# Patient Record
Sex: FEMALE | Race: WHITE | Employment: UNEMPLOYED | URBAN - METROPOLITAN AREA
[De-identification: names, ages, dates, MRNs, and addresses within clinical notes are randomized per-mention and may not be internally consistent; named-entity substitution may affect disease eponyms.]

---

## 2017-08-28 ENCOUNTER — TELEPHONE (OUTPATIENT)
Dept: INTERNAL MEDICINE CLINIC | Facility: CLINIC | Age: 37
End: 2017-08-28

## 2018-11-26 ENCOUNTER — OFFICE VISIT (OUTPATIENT)
Dept: INTERNAL MEDICINE CLINIC | Facility: CLINIC | Age: 38
End: 2018-11-26
Payer: COMMERCIAL

## 2018-11-26 VITALS
HEART RATE: 61 BPM | WEIGHT: 127 LBS | HEIGHT: 65 IN | BODY MASS INDEX: 21.16 KG/M2 | RESPIRATION RATE: 17 BRPM | DIASTOLIC BLOOD PRESSURE: 52 MMHG | SYSTOLIC BLOOD PRESSURE: 108 MMHG | OXYGEN SATURATION: 98 %

## 2018-11-26 DIAGNOSIS — R23.2 HOT FLASHES: ICD-10-CM

## 2018-11-26 DIAGNOSIS — Z23 NEED FOR INFLUENZA VACCINATION: ICD-10-CM

## 2018-11-26 DIAGNOSIS — F41.0 ANXIETY ATTACK: ICD-10-CM

## 2018-11-26 DIAGNOSIS — N92.0 MENORRHAGIA WITH REGULAR CYCLE: ICD-10-CM

## 2018-11-26 DIAGNOSIS — Z00.00 PHYSICAL EXAM: Primary | ICD-10-CM

## 2018-11-26 PROCEDURE — 99396 PREV VISIT EST AGE 40-64: CPT | Performed by: FAMILY MEDICINE

## 2018-11-26 PROCEDURE — 90686 IIV4 VACC NO PRSV 0.5 ML IM: CPT | Performed by: FAMILY MEDICINE

## 2018-11-26 PROCEDURE — 90471 IMMUNIZATION ADMIN: CPT | Performed by: FAMILY MEDICINE

## 2018-11-26 NOTE — PROGRESS NOTES
HPI:   Oriana Spring is a 34year old female who presents for a complete physical exam. Symptoms: denies discharge, itching, burning or dysuria.    Last pap:  9/2015 normal  And HPV negative, on period today  Menses:   Normal , heavier  Worried hormones o Grandfather         bone, bladder and prostate cancer   • Melanoma Maternal Grandfather    • Ovarian Cancer Maternal Aunt    • Hypertension Paternal Grandfather    • Diabetes Paternal Grandfather    • Heart Disorder Paternal Grandfather    • Non Melanoma M and neg HPV 2015. Normal before that. Can wait 1-2 more years per guidelines. Will do at next physical if does not get done by gyn  · Mammogram:  needs. Self breast exam explained and encouraged to perform monthly.    · Health maintenance, will check fasti

## 2018-12-05 ENCOUNTER — NURSE ONLY (OUTPATIENT)
Dept: INTERNAL MEDICINE CLINIC | Facility: CLINIC | Age: 38
End: 2018-12-05
Payer: COMMERCIAL

## 2018-12-05 DIAGNOSIS — L21.9 SEBORRHEIC DERMATITIS, UNSPECIFIED: ICD-10-CM

## 2018-12-05 DIAGNOSIS — Z00.00 PHYSICAL EXAM: ICD-10-CM

## 2018-12-05 PROCEDURE — 80050 GENERAL HEALTH PANEL: CPT | Performed by: FAMILY MEDICINE

## 2018-12-05 PROCEDURE — 80061 LIPID PANEL: CPT | Performed by: FAMILY MEDICINE

## 2018-12-05 PROCEDURE — 36415 COLL VENOUS BLD VENIPUNCTURE: CPT | Performed by: FAMILY MEDICINE

## 2018-12-05 NOTE — PROGRESS NOTES
Pt presented to clinic today for blood draw. Per physician able to draw orders. Orders  documented within chart. Pt tolerated lab draw well.  verified.   Orders drawn include: tsh, cmp, lipid, cbc   Site of draw: rt moises Rasmussen, MARILEE

## 2018-12-06 ENCOUNTER — TELEPHONE (OUTPATIENT)
Dept: INTERNAL MEDICINE CLINIC | Facility: CLINIC | Age: 38
End: 2018-12-06

## 2019-01-10 ENCOUNTER — TELEPHONE (OUTPATIENT)
Dept: INTERNAL MEDICINE CLINIC | Facility: CLINIC | Age: 39
End: 2019-01-10

## 2019-01-10 NOTE — TELEPHONE ENCOUNTER
Ready to try antidepressant-antianxiety. Please call. Can she have a script first, then follow-up? [facility] on [address].

## 2019-01-10 NOTE — TELEPHONE ENCOUNTER
Informed patient that lexapro was ordered. Discussed potential side effects, when to stop and call MD, and it may take several weeks before she feels positive effects. Patient to make appointment to see PCP in 4 weeks after starting medication.

## 2019-02-07 RX ORDER — ESCITALOPRAM OXALATE 10 MG/1
TABLET ORAL
Qty: 30 TABLET | Refills: 0 | Status: SHIPPED | OUTPATIENT
Start: 2019-02-07 | End: 2019-04-29

## 2019-02-08 ENCOUNTER — TELEPHONE (OUTPATIENT)
Dept: INTERNAL MEDICINE CLINIC | Facility: CLINIC | Age: 39
End: 2019-02-08

## 2019-02-08 NOTE — TELEPHONE ENCOUNTER
Per Dr. Pauleen Lundborg, called patient, no answer, left vm informing that she will need to make  follow up appt before medication can be approved again.

## 2019-02-20 PROBLEM — F41.9 ANXIETY: Status: ACTIVE | Noted: 2019-02-20

## 2019-02-20 NOTE — PROGRESS NOTES
CC:  Medication Follow-Up (Patient is here for follow up on Lexapro)      Hx of CC:    F/u lexapro for anxiety  Not much difference at all, maybe the slightest bit less anxious  Feels shut down still and easily irritated  Anxiety not much better  Dark ment chest pain   Pulmonary:  No cough, no SOB  GI:  No N/V/D  Dermatologic:  No rashes    Physical:    Ht 65\"   Wt 121 lb 3.2 oz   BMI 20.17 kg/m²     General:  Alert, appropriate, no acute distress  HEENT:  Normocephalic, supple. Moist mucus membranes.    Hitesh

## 2019-02-26 ENCOUNTER — TELEPHONE (OUTPATIENT)
Dept: INTERNAL MEDICINE CLINIC | Facility: CLINIC | Age: 39
End: 2019-02-26

## 2019-02-26 DIAGNOSIS — N76.0 ACUTE VAGINITIS: Primary | ICD-10-CM

## 2019-02-26 RX ORDER — FLUCONAZOLE 150 MG/1
150 TABLET ORAL ONCE
Qty: 1 TABLET | Refills: 0 | Status: SHIPPED | OUTPATIENT
Start: 2019-02-26 | End: 2019-02-26

## 2019-02-26 NOTE — TELEPHONE ENCOUNTER
Patient called stating she has recurring yeast infections and would like to know if she can get a prescription for Diflucan.  She has used this in the past.

## 2019-04-28 DIAGNOSIS — F41.9 ANXIETY: ICD-10-CM

## 2019-04-29 RX ORDER — ESCITALOPRAM OXALATE 20 MG/1
TABLET ORAL
Qty: 30 TABLET | Refills: 1 | Status: SHIPPED | OUTPATIENT
Start: 2019-04-29 | End: 2019-07-06

## 2019-07-06 DIAGNOSIS — F41.9 ANXIETY: ICD-10-CM

## 2019-07-08 RX ORDER — ESCITALOPRAM OXALATE 20 MG/1
TABLET ORAL
Qty: 30 TABLET | Refills: 1 | Status: SHIPPED | OUTPATIENT
Start: 2019-07-08 | End: 2019-07-31

## 2019-07-31 ENCOUNTER — OFFICE VISIT (OUTPATIENT)
Dept: INTERNAL MEDICINE CLINIC | Facility: CLINIC | Age: 39
End: 2019-07-31
Payer: COMMERCIAL

## 2019-07-31 VITALS
WEIGHT: 120.81 LBS | BODY MASS INDEX: 20.13 KG/M2 | TEMPERATURE: 99 F | HEART RATE: 62 BPM | DIASTOLIC BLOOD PRESSURE: 64 MMHG | HEIGHT: 65 IN | OXYGEN SATURATION: 100 % | RESPIRATION RATE: 18 BRPM | SYSTOLIC BLOOD PRESSURE: 102 MMHG

## 2019-07-31 DIAGNOSIS — F41.9 ANXIETY: ICD-10-CM

## 2019-07-31 PROCEDURE — 99213 OFFICE O/P EST LOW 20 MIN: CPT | Performed by: FAMILY MEDICINE

## 2019-07-31 RX ORDER — ESCITALOPRAM OXALATE 20 MG/1
20 TABLET ORAL
Qty: 90 TABLET | Refills: 3 | Status: SHIPPED | OUTPATIENT
Start: 2019-07-31 | End: 2020-03-08

## 2019-07-31 NOTE — PROGRESS NOTES
CC:  Follow - Up (Medication follow up )      Hx of CC:    F/u lexapro 20   Doing great  Mood stable  No SE  Sleeping fine  Feels improved energy, motivation and less irritable   Less anxious and not worrying as much  Does exercise often      Allergies:  N see above     Physical:    /64 (BP Location: Right arm, Patient Position: Sitting, Cuff Size: adult)   Pulse 62   Temp 98.7 °F (37.1 °C) (Oral)   Resp 18   Ht 65\"   Wt 120 lb 12.8 oz   LMP 07/30/2019   SpO2 100%   BMI 20.10 kg/m²     General:  Alert

## 2020-01-21 ENCOUNTER — OFFICE VISIT (OUTPATIENT)
Dept: OBGYN CLINIC | Facility: CLINIC | Age: 40
End: 2020-01-21
Payer: COMMERCIAL

## 2020-01-21 VITALS
HEART RATE: 51 BPM | BODY MASS INDEX: 20.66 KG/M2 | WEIGHT: 124 LBS | DIASTOLIC BLOOD PRESSURE: 68 MMHG | SYSTOLIC BLOOD PRESSURE: 114 MMHG | HEIGHT: 65 IN

## 2020-01-21 DIAGNOSIS — N92.3 SPOTTING BETWEEN MENSES: ICD-10-CM

## 2020-01-21 DIAGNOSIS — Z12.31 VISIT FOR SCREENING MAMMOGRAM: ICD-10-CM

## 2020-01-21 DIAGNOSIS — Z12.4 SCREENING FOR MALIGNANT NEOPLASM OF CERVIX: ICD-10-CM

## 2020-01-21 DIAGNOSIS — N89.8 VAGINAL ODOR: ICD-10-CM

## 2020-01-21 DIAGNOSIS — Z01.419 ENCOUNTER FOR GYNECOLOGICAL EXAMINATION WITHOUT ABNORMAL FINDING: Primary | ICD-10-CM

## 2020-01-21 PROCEDURE — 99386 PREV VISIT NEW AGE 40-64: CPT | Performed by: OBSTETRICS & GYNECOLOGY

## 2020-01-21 PROCEDURE — 99213 OFFICE O/P EST LOW 20 MIN: CPT | Performed by: OBSTETRICS & GYNECOLOGY

## 2020-01-21 NOTE — PROGRESS NOTES
Uri Moore is a 37year old female  Patient's last menstrual period was 2020. Patient presents with:  Gyn Exam: annual  Pt c/o sweating more at night right before her menses- never hot flashes during the day.   She informed her pcp of thi Comment: 3 times a week      Drug use: Not on file      Sexual activity: Not on file    Lifestyle      Physical activity:        Days per week: Not on file        Minutes per session: Not on file      Stress: Not on file    Relationships      Social conne mouth once daily. , Disp: 90 tablet, Rfl: 3    ALLERGIES:  No Known Allergies    Blood pressure 114/68, pulse 51, height 5' 5\" (1.651 m), weight 124 lb (56.2 kg), last menstrual period 01/13/2020.     Review of Systems:  Constitutional:  Denies fatigue, nig tenderness on motion,+contact bleeding with pap  Uterus: normal in size, contour, position, mobility, without tenderness  Adnexa: normal without masses or tenderness  Perineum: normal  Anus: no hemorroids     Assessment & Plan:    Encounter for gynecologic

## 2020-01-22 ENCOUNTER — TELEPHONE (OUTPATIENT)
Dept: OBGYN CLINIC | Facility: CLINIC | Age: 40
End: 2020-01-22

## 2020-01-22 LAB
GENITAL VAGINOSIS SCREEN: POSITIVE
HPV I/H RISK 1 DNA SPEC QL NAA+PROBE: POSITIVE
TRICHOMONAS SCREEN: NEGATIVE

## 2020-01-22 NOTE — TELEPHONE ENCOUNTER
----- Message from Evonne Denton MD sent at 1/22/2020 12:39 PM CST -----  Please inform pt that vag culture results were positive for BV which is an overgrowth of normal vaginal bacteria.   Please send erx for metrogel vag 1 applicator intravag qhs x 5 co

## 2020-01-23 RX ORDER — METRONIDAZOLE 7.5 MG/G
GEL VAGINAL
Qty: 1 TUBE | Refills: 0 | Status: SHIPPED | OUTPATIENT
Start: 2020-01-23 | End: 2021-04-07 | Stop reason: ALTCHOICE

## 2020-02-14 ENCOUNTER — HOSPITAL ENCOUNTER (OUTPATIENT)
Dept: MAMMOGRAPHY | Facility: HOSPITAL | Age: 40
Discharge: HOME | End: 2020-02-14
Attending: OBSTETRICS & GYNECOLOGY
Payer: COMMERCIAL

## 2020-02-14 DIAGNOSIS — Z12.31 VISIT FOR SCREENING MAMMOGRAM: ICD-10-CM

## 2020-02-14 PROCEDURE — 77067 SCR MAMMO BI INCL CAD: CPT | Performed by: OBSTETRICS & GYNECOLOGY

## 2020-02-14 PROCEDURE — 77063 BREAST TOMOSYNTHESIS BI: CPT | Performed by: OBSTETRICS & GYNECOLOGY

## 2020-02-26 ENCOUNTER — HOSPITAL ENCOUNTER (OUTPATIENT)
Dept: ULTRASOUND IMAGING | Facility: HOSPITAL | Age: 40
Discharge: HOME | End: 2020-02-26
Attending: OBSTETRICS & GYNECOLOGY
Payer: COMMERCIAL

## 2020-02-26 ENCOUNTER — TELEPHONE (OUTPATIENT)
Dept: OBGYN CLINIC | Facility: CLINIC | Age: 40
End: 2020-02-26

## 2020-02-26 ENCOUNTER — APPOINTMENT (OUTPATIENT)
Dept: LAB | Facility: HOSPITAL | Age: 40
End: 2020-02-26
Attending: OBSTETRICS & GYNECOLOGY
Payer: COMMERCIAL

## 2020-02-26 DIAGNOSIS — Z32.00 PREGNANCY EXAMINATION OR TEST, PREGNANCY UNCONFIRMED: Primary | ICD-10-CM

## 2020-02-26 DIAGNOSIS — N92.3 SPOTTING BETWEEN MENSES: ICD-10-CM

## 2020-02-26 LAB — HCG SERPL QL: NEGATIVE

## 2020-02-26 PROCEDURE — 84703 CHORIONIC GONADOTROPIN ASSAY: CPT

## 2020-02-26 PROCEDURE — 36415 COLL VENOUS BLD VENIPUNCTURE: CPT

## 2020-02-26 PROCEDURE — 76830 TRANSVAGINAL US NON-OB: CPT | Performed by: OBSTETRICS & GYNECOLOGY

## 2020-02-26 PROCEDURE — 76856 US EXAM PELVIC COMPLETE: CPT | Performed by: OBSTETRICS & GYNECOLOGY

## 2020-02-26 NOTE — TELEPHONE ENCOUNTER
PT IS ALREADY SCHEDULED FOR EMBX. ASSURED PT CAP'S NOTES INDICATE IT DOESN'T MATTER WHICH TEST IS DONE FIRST SO ASSISTED IN SCHEDULING COLPO. PT CHECKED HER FEDER[place] AND IS FAIRLY CERTAIN SHE WILL NOT BLEEDING FIRST WEEK OF April SO BOOKED COLPO FOR 4-3-20.

## 2020-02-27 ENCOUNTER — OFFICE VISIT (OUTPATIENT)
Dept: OBGYN CLINIC | Facility: CLINIC | Age: 40
End: 2020-02-27
Payer: COMMERCIAL

## 2020-02-27 VITALS
BODY MASS INDEX: 21 KG/M2 | SYSTOLIC BLOOD PRESSURE: 124 MMHG | DIASTOLIC BLOOD PRESSURE: 74 MMHG | WEIGHT: 125 LBS | HEART RATE: 60 BPM

## 2020-02-27 DIAGNOSIS — N84.0 ENDOMETRIAL POLYP: ICD-10-CM

## 2020-02-27 DIAGNOSIS — N92.3 SPOTTING BETWEEN MENSES: Primary | ICD-10-CM

## 2020-02-27 PROBLEM — N93.9 ABNORMAL UTERINE BLEEDING DUE TO ENDOMETRIAL POLYP: Status: ACTIVE | Noted: 2020-02-27

## 2020-02-27 PROCEDURE — 58100 BIOPSY OF UTERUS LINING: CPT | Performed by: OBSTETRICS & GYNECOLOGY

## 2020-02-27 PROCEDURE — 99212 OFFICE O/P EST SF 10 MIN: CPT | Performed by: OBSTETRICS & GYNECOLOGY

## 2020-02-27 NOTE — PROCEDURES
Endometrial Biopsy    Pre-Procedure Care:   Consent was obtained. Procedure/risks were explained. Questions were answered. Correct patient was identified. Correct side and site were confirmed.     Pregnancy Results: negative from blood test   Birth cont

## 2020-02-27 NOTE — PROGRESS NOTES
Teo Ardon    11/3/1980       Patient presents with:  Procedure: ENDOMETRIAL BX  pt had pelvic US done 2/26/20 and there appears to be a 0.8cm polyp- I explained to pt that this the most likely cause for her metrorrhagia.   We discussed manage

## 2020-03-08 DIAGNOSIS — F41.9 ANXIETY: ICD-10-CM

## 2020-03-09 RX ORDER — ESCITALOPRAM OXALATE 20 MG/1
20 TABLET ORAL
Qty: 90 TABLET | Refills: 3 | Status: SHIPPED | OUTPATIENT
Start: 2020-03-09 | End: 2020-11-05

## 2020-04-02 ENCOUNTER — PATIENT MESSAGE (OUTPATIENT)
Dept: OBGYN CLINIC | Facility: CLINIC | Age: 40
End: 2020-04-02

## 2020-04-02 NOTE — TELEPHONE ENCOUNTER
Message to [address] (home MD).     HERB winn to r/s colpo, pt pap was ASCUS +HPV

## 2020-04-02 NOTE — TELEPHONE ENCOUNTER
From: Sophia Ardon  To: Adrienne Jacinto MD  Sent: 4/2/2020 12:22 AM CDT  Subject: Non-Urgent Medical Question    Good evening Dr. Robby Santana,    I hope this finds you doing well and staying healthy.      I wanted to get your opinion before I started ca

## 2020-10-02 ENCOUNTER — APPOINTMENT (OUTPATIENT)
Dept: LAB | Age: 40
End: 2020-10-02
Attending: FAMILY MEDICINE
Payer: COMMERCIAL

## 2020-10-02 ENCOUNTER — TELEPHONE (OUTPATIENT)
Dept: INTERNAL MEDICINE CLINIC | Facility: CLINIC | Age: 40
End: 2020-10-02

## 2020-10-02 DIAGNOSIS — Z20.822 SUSPECTED COVID-19 VIRUS INFECTION: ICD-10-CM

## 2020-10-02 DIAGNOSIS — Z20.822 SUSPECTED COVID-19 VIRUS INFECTION: Primary | ICD-10-CM

## 2020-10-02 NOTE — TELEPHONE ENCOUNTER
Patient has allergies with sneezing and itching eyes. Not sure if these symptoms are strictly allergy this time. ALSO has a dry cough along with achiness. The latter symptoms started yesterday.     Within her group of friends, numerous people have tested p

## 2020-10-02 NOTE — TELEPHONE ENCOUNTER
Patient would like a call back from the Triage Nurse as she would like to discuss Covid symptoms and testing. She has allergies, so she wants to make sure it truly is allergies and not Covid. She has itchy eyes, and sneezing.   Cough in the mornings but

## 2020-10-24 ENCOUNTER — IMMUNIZATION (OUTPATIENT)
Dept: FAMILY MEDICINE CLINIC | Facility: CLINIC | Age: 40
End: 2020-10-24
Payer: COMMERCIAL

## 2020-10-24 PROCEDURE — 90471 IMMUNIZATION ADMIN: CPT | Performed by: PHYSICIAN ASSISTANT

## 2020-10-24 PROCEDURE — 90686 IIV4 VACC NO PRSV 0.5 ML IM: CPT | Performed by: PHYSICIAN ASSISTANT

## 2020-11-05 DIAGNOSIS — F41.9 ANXIETY: ICD-10-CM

## 2020-11-05 RX ORDER — ESCITALOPRAM OXALATE 20 MG/1
20 TABLET ORAL
Qty: 90 TABLET | Refills: 3 | Status: SHIPPED | OUTPATIENT
Start: 2020-11-05 | End: 2021-04-07

## 2021-01-29 ENCOUNTER — TELEPHONE (OUTPATIENT)
Dept: INTERNAL MEDICINE CLINIC | Facility: CLINIC | Age: 41
End: 2021-01-29

## 2021-01-29 DIAGNOSIS — Z00.00 PHYSICAL EXAM: Primary | ICD-10-CM

## 2021-01-29 NOTE — TELEPHONE ENCOUNTER
Patient scheduled her annual physical for 4/7. She's asking if an order can please be put in, so she can set-up a nurse appointment to have this done before her appointment.

## 2021-04-06 ENCOUNTER — NURSE ONLY (OUTPATIENT)
Dept: INTERNAL MEDICINE CLINIC | Facility: CLINIC | Age: 41
End: 2021-04-06
Payer: COMMERCIAL

## 2021-04-06 DIAGNOSIS — D64.9 ANEMIA, UNSPECIFIED TYPE: ICD-10-CM

## 2021-04-06 DIAGNOSIS — Z00.00 PHYSICAL EXAM: ICD-10-CM

## 2021-04-06 PROCEDURE — 82728 ASSAY OF FERRITIN: CPT | Performed by: FAMILY MEDICINE

## 2021-04-06 PROCEDURE — 80061 LIPID PANEL: CPT | Performed by: FAMILY MEDICINE

## 2021-04-06 PROCEDURE — 36415 COLL VENOUS BLD VENIPUNCTURE: CPT | Performed by: FAMILY MEDICINE

## 2021-04-06 PROCEDURE — 84466 ASSAY OF TRANSFERRIN: CPT | Performed by: FAMILY MEDICINE

## 2021-04-06 PROCEDURE — 80050 GENERAL HEALTH PANEL: CPT | Performed by: FAMILY MEDICINE

## 2021-04-06 PROCEDURE — 82306 VITAMIN D 25 HYDROXY: CPT | Performed by: FAMILY MEDICINE

## 2021-04-06 PROCEDURE — 83540 ASSAY OF IRON: CPT | Performed by: FAMILY MEDICINE

## 2021-04-07 ENCOUNTER — OFFICE VISIT (OUTPATIENT)
Dept: INTERNAL MEDICINE CLINIC | Facility: CLINIC | Age: 41
End: 2021-04-07
Payer: COMMERCIAL

## 2021-04-07 VITALS
DIASTOLIC BLOOD PRESSURE: 62 MMHG | BODY MASS INDEX: 20.66 KG/M2 | HEIGHT: 65 IN | OXYGEN SATURATION: 100 % | SYSTOLIC BLOOD PRESSURE: 116 MMHG | WEIGHT: 124 LBS | HEART RATE: 60 BPM | RESPIRATION RATE: 14 BRPM

## 2021-04-07 DIAGNOSIS — F41.9 ANXIETY: ICD-10-CM

## 2021-04-07 DIAGNOSIS — H61.21 CERUMEN DEBRIS ON TYMPANIC MEMBRANE OF RIGHT EAR: ICD-10-CM

## 2021-04-07 DIAGNOSIS — Z12.31 ENCOUNTER FOR SCREENING MAMMOGRAM FOR MALIGNANT NEOPLASM OF BREAST: ICD-10-CM

## 2021-04-07 DIAGNOSIS — D50.9 IRON DEFICIENCY ANEMIA, UNSPECIFIED IRON DEFICIENCY ANEMIA TYPE: ICD-10-CM

## 2021-04-07 DIAGNOSIS — Z00.00 PHYSICAL EXAM: Primary | ICD-10-CM

## 2021-04-07 PROCEDURE — 3074F SYST BP LT 130 MM HG: CPT | Performed by: FAMILY MEDICINE

## 2021-04-07 PROCEDURE — 3078F DIAST BP <80 MM HG: CPT | Performed by: FAMILY MEDICINE

## 2021-04-07 PROCEDURE — 3008F BODY MASS INDEX DOCD: CPT | Performed by: FAMILY MEDICINE

## 2021-04-07 PROCEDURE — 99396 PREV VISIT EST AGE 40-64: CPT | Performed by: FAMILY MEDICINE

## 2021-04-07 RX ORDER — MELATONIN
325
Qty: 90 TABLET | Refills: 2 | Status: SHIPPED | OUTPATIENT
Start: 2021-04-07

## 2021-04-07 RX ORDER — ESCITALOPRAM OXALATE 20 MG/1
20 TABLET ORAL
Qty: 90 TABLET | Refills: 3 | Status: SHIPPED | OUTPATIENT
Start: 2021-04-07 | End: 2021-12-10

## 2021-04-07 RX ORDER — MULTIVIT-MIN/IRON FUM/FOLIC AC 7.5 MG-4
1 TABLET ORAL DAILY
COMMUNITY

## 2021-04-07 RX ORDER — DOCUSATE SODIUM 100 MG/1
100 CAPSULE, LIQUID FILLED ORAL 2 TIMES DAILY PRN
Qty: 180 CAPSULE | Refills: 0 | Status: SHIPPED | OUTPATIENT
Start: 2021-04-07

## 2021-04-07 NOTE — PROGRESS NOTES
HPI:   Inga Vlades is a 41year old female who presents for a complete physical exam.    Last pap:  Needs to sched colpo with gyn for ascus  Has had some gyn issues- needs to f/u with gyn- overdue  Went there for spotting in between periods.  Dx 1,000 Units by mouth daily. • escitalopram 20 MG Oral Tab Take 1 tablet (20 mg total) by mouth once daily.  90 tablet 3      Past Medical History:   Diagnosis Date   • Depression with anxiety     on lexapro      Past Surgical History:   Procedure Brad Lewis EYES:PERRLA, EOMI,conjunctiva are clear  NECK: supple,no adenopathy  BREAST: no dominant or suspicious mass, no nipple discharge  LUNGS: clear to auscultation  CARDIO: RRR without murmur  GI: good BS's,no masses, HSM or tenderness  EXTREMITIES: no edema she should decrease to twice a day and contact me. She has moderately decreased WBC of 3.6 on her CBC. If this does not improve as the hemoglobin improves well discuss  referral to hematology for further eval.  Recheck blood work in 1 month.     - keegan

## 2021-04-12 ENCOUNTER — TELEPHONE (OUTPATIENT)
Dept: OBGYN CLINIC | Facility: CLINIC | Age: 41
End: 2021-04-12

## 2021-04-12 DIAGNOSIS — Z32.00 PREGNANCY EXAMINATION OR TEST, PREGNANCY UNCONFIRMED: Primary | ICD-10-CM

## 2021-04-13 ENCOUNTER — HOSPITAL ENCOUNTER (OUTPATIENT)
Dept: MAMMOGRAPHY | Age: 41
Discharge: HOME | End: 2021-04-13
Attending: FAMILY MEDICINE
Payer: COMMERCIAL

## 2021-04-13 DIAGNOSIS — Z12.31 ENCOUNTER FOR SCREENING MAMMOGRAM FOR MALIGNANT NEOPLASM OF BREAST: ICD-10-CM

## 2021-04-13 PROCEDURE — 77063 BREAST TOMOSYNTHESIS BI: CPT | Performed by: FAMILY MEDICINE

## 2021-04-13 PROCEDURE — 77067 SCR MAMMO BI INCL CAD: CPT | Performed by: FAMILY MEDICINE

## 2021-05-19 ENCOUNTER — LAB ENCOUNTER (OUTPATIENT)
Dept: LAB | Age: 41
End: 2021-05-19
Attending: OBSTETRICS & GYNECOLOGY
Payer: COMMERCIAL

## 2021-05-19 DIAGNOSIS — Z32.00 PREGNANCY EXAMINATION OR TEST, PREGNANCY UNCONFIRMED: ICD-10-CM

## 2021-05-19 DIAGNOSIS — D50.9 IRON DEFICIENCY ANEMIA, UNSPECIFIED IRON DEFICIENCY ANEMIA TYPE: ICD-10-CM

## 2021-05-19 PROCEDURE — 85025 COMPLETE CBC W/AUTO DIFF WBC: CPT

## 2021-05-19 PROCEDURE — 83540 ASSAY OF IRON: CPT

## 2021-05-19 PROCEDURE — 84703 CHORIONIC GONADOTROPIN ASSAY: CPT

## 2021-05-19 PROCEDURE — 36415 COLL VENOUS BLD VENIPUNCTURE: CPT

## 2021-05-19 PROCEDURE — 84466 ASSAY OF TRANSFERRIN: CPT

## 2021-05-20 ENCOUNTER — TELEPHONE (OUTPATIENT)
Dept: OBGYN CLINIC | Facility: CLINIC | Age: 41
End: 2021-05-20

## 2021-05-20 ENCOUNTER — OFFICE VISIT (OUTPATIENT)
Dept: OBGYN CLINIC | Facility: CLINIC | Age: 41
End: 2021-05-20
Payer: COMMERCIAL

## 2021-05-20 ENCOUNTER — TELEPHONE (OUTPATIENT)
Dept: INTERNAL MEDICINE CLINIC | Facility: CLINIC | Age: 41
End: 2021-05-20

## 2021-05-20 VITALS
SYSTOLIC BLOOD PRESSURE: 122 MMHG | HEART RATE: 57 BPM | DIASTOLIC BLOOD PRESSURE: 73 MMHG | BODY MASS INDEX: 20 KG/M2 | WEIGHT: 122.81 LBS

## 2021-05-20 DIAGNOSIS — D50.9 IRON DEFICIENCY ANEMIA, UNSPECIFIED IRON DEFICIENCY ANEMIA TYPE: Primary | ICD-10-CM

## 2021-05-20 DIAGNOSIS — R87.810 ASCUS WITH POSITIVE HIGH RISK HPV CERVICAL: Primary | ICD-10-CM

## 2021-05-20 DIAGNOSIS — R87.610 ASCUS WITH POSITIVE HIGH RISK HPV CERVICAL: Primary | ICD-10-CM

## 2021-05-20 PROCEDURE — 57454 BX/CURETT OF CERVIX W/SCOPE: CPT | Performed by: OBSTETRICS & GYNECOLOGY

## 2021-05-20 PROCEDURE — 3078F DIAST BP <80 MM HG: CPT | Performed by: OBSTETRICS & GYNECOLOGY

## 2021-05-20 PROCEDURE — 3074F SYST BP LT 130 MM HG: CPT | Performed by: OBSTETRICS & GYNECOLOGY

## 2021-05-20 NOTE — TELEPHONE ENCOUNTER
Per our discussion at her office visit today I looked up what procedure she was supposed to have prior to covid.   She is still having intermenstrual bleeding and on her US last year she had an 8mm endometrial polyp- we discussed endosee vs hy/D&C at the ti

## 2021-05-20 NOTE — TELEPHONE ENCOUNTER
Schedule endosee for this patient:  **Do referral if needed. **    Timing: D#7-10, with D#1 being flow    Diagnosis: intermenstrual bleeding, endometrial polyp    Blood tests: n/a    Medication prep: No    Pain med prep: motrin 600 mg one hour before    Procedure room: needed    Rep Needed: No    Additional equipment: no

## 2021-05-20 NOTE — TELEPHONE ENCOUNTER
Pt. Given recs per CAP and instructed to call on day #1 of her period ( day 1 being a full flow). Pt. Verbalized understanding.

## 2021-05-20 NOTE — TELEPHONE ENCOUNTER
Called patient to follow-up on blood test.  Her iron and hemoglobin are now normal.  She has been seeing Gyn for work-up of heavy periods and also spotting in between her periods.   She has a possible polyp and is can get a procedure to see if this needs to

## 2021-05-20 NOTE — PROCEDURES
Colpo w/Cx Biopsy and ECC    Pregnancy Results: n/a  Birth control method(s) used:   condoms      Consent signed. Procedure discussed with patient in detail including indication, risk, benefits, alternatives and complications.     Pre-Procedure:  Pre-Meds:

## 2021-05-26 NOTE — PROGRESS NOTES
Pt advised of results and recs and verbalized understanding. Pt also reports her period started today and she needs to schedule an endosee but pt is leaving town for 2 weeks. Advised pt to call on day 1 of next period.

## 2021-08-16 ENCOUNTER — TELEPHONE (OUTPATIENT)
Dept: OBGYN CLINIC | Facility: CLINIC | Age: 41
End: 2021-08-16

## 2021-08-16 NOTE — TELEPHONE ENCOUNTER
Pt calling to report she started her period yesterday and needs to schedule endosee. Pt informed endosee is scheduled between days 7-10 of her cycle, and based on day 1 of her cycle we would only be able to schedule her in this time frame on either 8/23 or 8/24. Pt informed that CAP is out of the office on 8/24. Message to CAP to please advise if pt can be added for endosee on 8/23?

## 2021-08-18 NOTE — TELEPHONE ENCOUNTER
Pt offered appt. Pt stated that she cannot come in until early in the morning or anytime after 4pm. Pt stated she started a new job and needs to be there for training right now. Asked pt if she would possibly be able to do an 8am with CAP on 8/24 if this were even an option (pt informed that CAP is not in the office this day and would be on call). Pt stated she will just wait until her next cycle to call to schedule endosee. Message to CAP as FYDANIE.

## 2021-08-18 NOTE — TELEPHONE ENCOUNTER
Endosee can be scheduled at 11:30 am on 8/23/2021. Schedule endosee for this patient:  **Do referral if needed. **    Timing: D#7-10, with D#1 being flow    Diagnosis: history of endometrial polyp    Blood tests: UPT in office    Medication prep: 400 mcg cytotec night prior    Pain med prep: motrin 600 mg one hour before    Procedure room: Yes    Rep Needed: No    Additional equipment: endoscopic grasper Reviewed results - ASCUS Pap, positive HPV, atypical glandular cells present..   Discussed FYWB Colposcopy and endometrial biopsy with her including:  Colposcopy is a procedure to closely examine your cervix, vagina and vulva for signs of disease. During colposcopy, your doctor uses a special instrument called a colposcope. If your doctor finds an unusual area of cells during colposcopy, a sample of tissue can be collected for laboratory testing (biopsy).    Colposcopy is usually done in the office, and the procedure typically takes 10 to 20 minutes. You'll lie on your back on a table with your feet in supports, just as during a pelvic exam or Pap test.  The doctor places a metal speculum in your vagina. Your doctor positions the special magnifying instrument, called a colposcope, a few inches away from your vulva. A bright light is shown into your vagina, and your doctor looks through the lens. Your cervix and vagina are swabbed with cotton to clear away any mucus. Your doctor may apply a solution of vinegar, this may cause a burning or tingling sensation. The solution helps highlight any areas of suspicious cells. If your doctor finds a suspicious area, a small sample of tissue may be collected for laboratory testing. To collect the tissue, your doctor uses a sharp biopsy instrument to remove a small piece of tissue.  To prepare for your colposcopy, your doctor may recommend that you:  Avoid scheduling your colposcopy during your period  Don't have vaginal intercourse the day or two before your colposcopy  Don't use tampons the day or two before your colposcopy  Don’t use vaginal medications for the two days before your colposcopy  Take an over-the-counter pain reliever, such as ibuprofen (Advil, Motrin IB, others) or acetaminophen (Tylenol, others), before going to your colposcopy appointment  Patient states understanding and has no questions.   Transferred to schedule appt

## 2021-12-09 DIAGNOSIS — F41.9 ANXIETY: ICD-10-CM

## 2021-12-10 RX ORDER — ESCITALOPRAM OXALATE 20 MG/1
TABLET ORAL
Qty: 90 TABLET | Refills: 3 | Status: SHIPPED | OUTPATIENT
Start: 2021-12-10

## 2022-01-06 LAB — AMB EXT COVID-19 RESULT: DETECTED

## 2022-01-14 ENCOUNTER — TELEPHONE (OUTPATIENT)
Dept: INTERNAL MEDICINE CLINIC | Facility: CLINIC | Age: 42
End: 2022-01-14

## 2022-01-14 NOTE — TELEPHONE ENCOUNTER
1/06 was positive. In the beginning had HA, aches, cough. Today feeling better except she has now developed a very deep cough and is fatigued-sleeping a lot. Afebrile. Cough not productive but feels something is \"moving\".   Cough occurs maybe twice da

## 2022-03-16 RX ORDER — ESCITALOPRAM OXALATE 20 MG/1
20 TABLET ORAL DAILY
Qty: 90 TABLET | Refills: 0 | Status: SHIPPED | OUTPATIENT
Start: 2022-03-16

## 2022-03-22 ENCOUNTER — TELEPHONE (OUTPATIENT)
Dept: INTERNAL MEDICINE CLINIC | Facility: HOSPITAL | Age: 42
End: 2022-03-22

## 2022-03-22 NOTE — TELEPHONE ENCOUNTER
[] [address] [facility]  []NICOLE   [x] [facility]  Manager :  Beto Jarrell    HAVE YOU RECEIVED THE COVID-19 Vaccine? Yes [x]    No []          If yes, date(s) received: 1/21/21; 2/11/21/ 10/30/21           Which vaccine:  Pfizer [x]     Ayah Soto []    J&J []      SYMPTOMS (reported via dashboard):  [] asymptomatic  [x] symptomatic - fever, cough, congestion, fatigue, body aches, chills, headache  [] GI symptoms only    Symptom onset date: 3/21/22  Any fever, vomiting or diarrhea?:Yes [x]     No []    If yes describe: fever    Employee has positive COVID Exposure? 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   []  May return to work if employee views negative result in [address] and remains fever, vomiting, and diarrhea free  []  May continue to work if remains asymptomatic and views negative result in [address]  [x]  Follow up for condition update when resulting  []  If symptoms develop, stay home and call hotline for rapid test order  []  If COVID positive results, off work minimum of 5 days from positive test or onset of symptoms (day 0)    []      On day 5, if asymptomatic or mildly symptomatic (with improving symptoms) may return to work day 6  []      On day 5, if symptomatic, call Employee Health for RTW screening        [x]  Plan noted above  [x]  Length of time to obtain results  []  Quarantine instructions  []  S/S of worsening infection/condition and importance of prompt medical re-evaluation including when to seek emergency care.    [x] The employee voiced understanding

## 2022-07-07 ENCOUNTER — TELEPHONE (OUTPATIENT)
Dept: INTERNAL MEDICINE CLINIC | Facility: CLINIC | Age: 42
End: 2022-07-07

## 2022-07-07 DIAGNOSIS — Z12.31 ENCOUNTER FOR SCREENING MAMMOGRAM FOR MALIGNANT NEOPLASM OF BREAST: Primary | ICD-10-CM

## 2022-07-07 NOTE — TELEPHONE ENCOUNTER
Patient would like order for mammogram entered. She is schedule for physical but couldn't get appointment until October. She was put on a wait list in case of any cancellations. Please call once order has been entered.

## 2022-07-08 ENCOUNTER — HOSPITAL ENCOUNTER (OUTPATIENT)
Dept: MAMMOGRAPHY | Age: 42
Discharge: HOME | End: 2022-07-08
Attending: FAMILY MEDICINE
Payer: COMMERCIAL

## 2022-07-08 DIAGNOSIS — Z12.31 ENCOUNTER FOR SCREENING MAMMOGRAM FOR MALIGNANT NEOPLASM OF BREAST: ICD-10-CM

## 2022-07-08 PROCEDURE — 77063 BREAST TOMOSYNTHESIS BI: CPT | Performed by: FAMILY MEDICINE

## 2022-07-08 PROCEDURE — 77067 SCR MAMMO BI INCL CAD: CPT | Performed by: FAMILY MEDICINE

## 2022-10-05 ENCOUNTER — OFFICE VISIT (OUTPATIENT)
Dept: INTERNAL MEDICINE CLINIC | Facility: CLINIC | Age: 42
End: 2022-10-05
Payer: COMMERCIAL

## 2022-10-05 VITALS
HEART RATE: 61 BPM | OXYGEN SATURATION: 98 % | HEIGHT: 65 IN | DIASTOLIC BLOOD PRESSURE: 76 MMHG | BODY MASS INDEX: 21.33 KG/M2 | WEIGHT: 128 LBS | SYSTOLIC BLOOD PRESSURE: 124 MMHG

## 2022-10-05 DIAGNOSIS — Z00.00 PHYSICAL EXAM: Primary | ICD-10-CM

## 2022-10-05 DIAGNOSIS — F41.9 ANXIETY: ICD-10-CM

## 2022-10-05 PROCEDURE — 99396 PREV VISIT EST AGE 40-64: CPT | Performed by: FAMILY MEDICINE

## 2022-10-05 PROCEDURE — 90471 IMMUNIZATION ADMIN: CPT | Performed by: FAMILY MEDICINE

## 2022-10-05 PROCEDURE — 3074F SYST BP LT 130 MM HG: CPT | Performed by: FAMILY MEDICINE

## 2022-10-05 PROCEDURE — 3008F BODY MASS INDEX DOCD: CPT | Performed by: FAMILY MEDICINE

## 2022-10-05 PROCEDURE — 3078F DIAST BP <80 MM HG: CPT | Performed by: FAMILY MEDICINE

## 2022-10-05 PROCEDURE — 90686 IIV4 VACC NO PRSV 0.5 ML IM: CPT | Performed by: FAMILY MEDICINE

## 2022-10-05 RX ORDER — ESCITALOPRAM OXALATE 20 MG/1
20 TABLET ORAL DAILY
Qty: 90 TABLET | Refills: 3 | Status: SHIPPED | OUTPATIENT
Start: 2022-10-05

## 2022-10-06 ENCOUNTER — MED REC SCAN ONLY (OUTPATIENT)
Dept: INTERNAL MEDICINE CLINIC | Facility: CLINIC | Age: 42
End: 2022-10-06

## 2022-12-02 ENCOUNTER — PATIENT MESSAGE (OUTPATIENT)
Dept: OBGYN CLINIC | Facility: CLINIC | Age: 42
End: 2022-12-02

## 2022-12-14 ENCOUNTER — TELEPHONE (OUTPATIENT)
Dept: INTERNAL MEDICINE CLINIC | Facility: CLINIC | Age: 42
End: 2022-12-14

## 2022-12-14 NOTE — TELEPHONE ENCOUNTER
Patient is calling in requesting a copy of her recent influenza vaccine be sent to her.      [address]

## 2023-06-08 ENCOUNTER — OFFICE VISIT (OUTPATIENT)
Dept: OBGYN CLINIC | Facility: CLINIC | Age: 43
End: 2023-06-08

## 2023-06-08 VITALS
SYSTOLIC BLOOD PRESSURE: 121 MMHG | WEIGHT: 122.38 LBS | DIASTOLIC BLOOD PRESSURE: 70 MMHG | BODY MASS INDEX: 20 KG/M2 | HEART RATE: 52 BPM

## 2023-06-08 DIAGNOSIS — Z01.419 ENCOUNTER FOR GYNECOLOGICAL EXAMINATION WITHOUT ABNORMAL FINDING: Primary | ICD-10-CM

## 2023-06-08 DIAGNOSIS — N60.01 BREAST CYST, RIGHT: ICD-10-CM

## 2023-06-08 PROCEDURE — 3078F DIAST BP <80 MM HG: CPT | Performed by: OBSTETRICS & GYNECOLOGY

## 2023-06-08 PROCEDURE — 99396 PREV VISIT EST AGE 40-64: CPT | Performed by: OBSTETRICS & GYNECOLOGY

## 2023-06-08 PROCEDURE — 3074F SYST BP LT 130 MM HG: CPT | Performed by: OBSTETRICS & GYNECOLOGY

## 2023-06-09 LAB — HPV I/H RISK 1 DNA SPEC QL NAA+PROBE: NEGATIVE

## 2023-06-14 ENCOUNTER — PATIENT MESSAGE (OUTPATIENT)
Dept: OBGYN CLINIC | Facility: CLINIC | Age: 43
End: 2023-06-14

## 2023-06-14 NOTE — TELEPHONE ENCOUNTER
From: German Ardon  To: Adrienne Jacinto MD  Sent: 6/14/2023 1:35 PM CDT  Subject: Heather Knows Prescription     At my last visit Dr. Arturo An stated she would prescribe Glenys for birth control. The prescription has not been sent to my pharmacy. If possible could it be sent today? Thank you!

## 2023-06-14 NOTE — TELEPHONE ENCOUNTER
To CAP to please advise, pt states rx for Lauren Lopez was discussed at annual exam on 6/8/23. OK to send to preferred pharmacy?

## 2023-06-19 RX ORDER — DROSPIRENONE AND ETHINYL ESTRADIOL 0.02-3(28)
1 KIT ORAL DAILY
Qty: 84 TABLET | Refills: 3 | Status: SHIPPED | OUTPATIENT
Start: 2023-06-19 | End: 2024-06-18

## 2023-06-19 NOTE — TELEPHONE ENCOUNTER
Ok to send in erx for Glenys 1 tab po every day x 3 months supply and refill until her next annual is due.   Please instruct her to start with her next menses

## 2023-11-07 ENCOUNTER — TELEPHONE (OUTPATIENT)
Dept: INTERNAL MEDICINE CLINIC | Facility: CLINIC | Age: 43
End: 2023-11-07

## 2023-11-07 ENCOUNTER — HOSPITAL ENCOUNTER (OUTPATIENT)
Age: 43
Discharge: STILL ADMITTED OR ADMINISTRATIVE | End: 2023-11-07
Payer: COMMERCIAL

## 2023-11-07 DIAGNOSIS — Z00.00 PHYSICAL EXAM: Primary | ICD-10-CM

## 2023-11-07 NOTE — TELEPHONE ENCOUNTER
Attempted to contact pt. Unable to reach. Message left on voice mail stating that Dr. Jaan Young ordered [facility] and ultrasound since 6/8/23.

## 2023-11-07 NOTE — TELEPHONE ENCOUNTER
Patient had to rescheduled her annual physical with Dr. Sherren Emperor, due to her schedule changing. Her appointment has now been rescheduled for the first week in January. She is asking if Dr. Sherren Emperor can please enter orders for lab work, and a diagnostic mammogram.  She said she  needs a diagnostic mammogram, as she has a lump under her right breast that they are monitoring.

## 2023-11-29 DIAGNOSIS — F41.9 ANXIETY: ICD-10-CM

## 2023-11-29 RX ORDER — ESCITALOPRAM OXALATE 20 MG/1
20 TABLET ORAL DAILY
Qty: 90 TABLET | Refills: 3 | Status: SHIPPED | OUTPATIENT
Start: 2023-11-29

## 2023-11-29 NOTE — TELEPHONE ENCOUNTER
Future Appt. 01/03/2024    Last Visit: 10/05/2022    Medication Requested:  Requested Prescriptions     Pending Prescriptions Disp Refills    escitalopram 20 MG Oral Tab 90 tablet 3     Sig: Take 1 tablet (20 mg total) by mouth daily.         Last refill:   escitalopram 20 MG Oral Tab 90 tablet 3 10/5/2022

## 2024-02-15 NOTE — PROGRESS NOTES
HPI:   Kayla Ardon is a 43 year old female who presents for a complete physical exam.    Last pap:  UTD with Dr Jacinto  Menses:  Regular on ocps but some spotting  Exercise:  5 days a week peloton melissa    Anxiety- on lexparo   helping her a lot.  Her anxiety is very well controlled.  Does have some night sweats     Some stress -  from her   Has a therapist        Is KELVIN Brown-   10 and 14   2 kids       Wt Readings from Last 6 Encounters:   02/19/24 126 lb (57.2 kg)   06/08/23 122 lb 6.4 oz (55.5 kg)   10/05/22 128 lb (58.1 kg)   05/20/21 122 lb 12.8 oz (55.7 kg)   04/07/21 124 lb (56.2 kg)   02/27/20 125 lb (56.7 kg)     Body mass index is 20.97 kg/m².     Cholesterol, Total (mg/dL)   Date Value   04/06/2021 216 (H)   12/05/2018 193   09/29/2015 185     HDL Cholesterol (mg/dL)   Date Value   04/06/2021 84 (H)   12/05/2018 71     HDL CHOLESTEROL (P) (mg/dL)   Date Value   09/29/2015 62     LDL Cholesterol (mg/dL)   Date Value   04/06/2021 115 (H)   12/05/2018 105 (H)   09/29/2015 108 (H)        Current Outpatient Medications   Medication Sig Dispense Refill    escitalopram 20 MG Oral Tab Take 1 tablet (20 mg total) by mouth daily. 90 tablet 3    escitalopram 20 MG Oral Tab Take 1 tablet (20 mg total) by mouth daily. 90 tablet 3    Drospirenone-Ethinyl Estradiol (YESSI) 3-0.02 MG Oral Tab Take 1 tablet by mouth daily. 84 tablet 3    Turmeric (QC TUMERIC COMPLEX OR)       Magnesium 400 MG Oral Cap       Multiple Vitamins-Minerals (MULTI-VITAMIN/MINERALS) Oral Tab Take 1 tablet by mouth daily.      Magnesium Cl-Calcium Carbonate 71.5-119 MG Oral Tab EC Take 2 tablets by mouth daily.      Vitamin D3, Cholecalciferol, 1000 UNITS Oral Cap Take 1,000 Units by mouth daily. (Patient not taking: Reported on 10/5/2022)      docusate sodium 100 MG Oral Cap Take 1 capsule (100 mg total) by mouth 2 (two) times daily as needed for constipation. (Patient not taking: Reported on 5/20/2021 ) 180 capsule 0       Past Medical History:   Diagnosis Date    Depression with anxiety     on lexapro      Past Surgical History:   Procedure Laterality Date      X2    2009      Family History   Problem Relation Age of Onset    Cancer Maternal Grandfather         bone, bladder and prostate cancer    Melanoma Maternal Grandfather     Ovarian Cancer Maternal Aunt         30's    Hypertension Paternal Grandfather     Diabetes Paternal Grandfather     Heart Disorder Paternal Grandfather     Non Melanoma Mother     Non Melanoma Father     Non Melanoma Maternal Aunt     Non Melanoma Maternal Uncle     Hypertension Paternal Grandmother     Colon Cancer Neg     Breast Cancer Neg     Prostate Cancer Neg     Endometrial Cancer Neg       Social History:   Social History     Socioeconomic History    Marital status:    Tobacco Use    Smoking status: Former     Types: Cigarettes     Quit date: 1998     Years since quittin.1    Smokeless tobacco: Never   Substance and Sexual Activity    Alcohol use: Yes     Comment: 3 times a week    Drug use: Not Currently    Sexual activity: Not Currently     Partners: Male     Birth control/protection: Condom   Other Topics Concern    Pt has a pacemaker No    Pt has a defibrillator No    Reaction to local anesthetic No          REVIEW OF SYSTEMS:   GENERAL: feels well otherwise,  LUNGS: denies shortness of breath  CARDIOVASCULAR: denies chest pain  GI: see hPI  PSYCHE:  Anxiety controlled    EXAM:   /70   Pulse 115   Ht 5' 5\" (1.651 m)   Wt 126 lb (57.2 kg)   LMP 2023   SpO2 98%   BMI 20.97 kg/m²   Body mass index is 20.97 kg/m².   GENERAL: well developed, well nourished,in no apparent distress  SKIN: no rashes,no suspicious lesions  HEENT: atraumatic, normocephalic,  EYES:,conjunctiva are clear  NECK: supple,no adenopathy  BREAST: small mobile mass 6 oclock position, no nipple discharge  LUNGS: clear to auscultation  CARDIO: RRR without murmur  GI: good BS's,no  masses, HSM or tenderness  EXTREMITIES: no edema    ASSESSMENT AND PLAN:   Kayla Ardon is a 43 year old female who presents for a complete physical exam.  Encounter Diagnoses   Name Primary?    Physical exam Yes    Encounter for screening mammogram for malignant neoplasm of breast     Anxiety     Screening for colon cancer      Discussed with patient pap smear guidelines and the importance of screening for cervical cancer  Discussed the importance of yearly mammograms starting at age 40 to help detect breast cancer.   Self breast exam explained and encouraged to perform monthly.   Health maintenance labs, recommend yearly: Lipids, CMP, and CBC.   Discussed importance of screening for colon cancer with colonoscopies starting at age 45, or sooner if high risk  Recommended low fat diet, low carb diet and regular aerobic exercise.    The patient indicates understanding of these issues and agrees to the plan.  The patient is asked to return for CPX in 1 yr.      1. Physical exam  Has diag mammo already order through gyn  - CBC With Differential With Platelet; Future  - Comp Metabolic Panel (14); Future  - TSH W Reflex To Free T4; Future  - Lipid Panel; Future  - TdaP (Adacel, Boostrix) [45082]    2. Encounter for screening mammogram for malignant neoplasm of breast      3. Anxiety  Controlled  CPM  Continue therapy   - escitalopram 20 MG Oral Tab; Take 1 tablet (20 mg total) by mouth daily.  Dispense: 90 tablet; Refill: 3    4. Screening for colon cancer      - Gastro GI Telephone Colon Screen; Future    5. Impacted cerumen of right ear      - ENT Referral - In Network    6. Decreased hearing of left ear      - Audiology Referral - In Network    No orders of the defined types were placed in this encounter.      Meds & Refills for this Visit:  Requested Prescriptions      No prescriptions requested or ordered in this encounter       Imaging & Consults:  [facility] MARKEL 2D+3D SCREENING BILAT (CPT=77067/10435)  OP  REFERRAL TO [facility] TELEPHONE COLON SCREEN

## 2024-02-19 ENCOUNTER — OFFICE VISIT (OUTPATIENT)
Dept: INTERNAL MEDICINE CLINIC | Facility: CLINIC | Age: 44
End: 2024-02-19
Payer: COMMERCIAL

## 2024-02-19 VITALS
DIASTOLIC BLOOD PRESSURE: 70 MMHG | BODY MASS INDEX: 20.99 KG/M2 | SYSTOLIC BLOOD PRESSURE: 110 MMHG | OXYGEN SATURATION: 98 % | HEIGHT: 65 IN | HEART RATE: 115 BPM | WEIGHT: 126 LBS

## 2024-02-19 DIAGNOSIS — H61.21 IMPACTED CERUMEN OF RIGHT EAR: ICD-10-CM

## 2024-02-19 DIAGNOSIS — Z12.31 ENCOUNTER FOR SCREENING MAMMOGRAM FOR MALIGNANT NEOPLASM OF BREAST: ICD-10-CM

## 2024-02-19 DIAGNOSIS — F41.9 ANXIETY: ICD-10-CM

## 2024-02-19 DIAGNOSIS — Z00.00 PHYSICAL EXAM: Primary | ICD-10-CM

## 2024-02-19 DIAGNOSIS — Z12.11 SCREENING FOR COLON CANCER: ICD-10-CM

## 2024-02-19 DIAGNOSIS — H91.92 DECREASED HEARING OF LEFT EAR: ICD-10-CM

## 2024-02-19 RX ORDER — ESCITALOPRAM OXALATE 20 MG/1
20 TABLET ORAL DAILY
Qty: 90 TABLET | Refills: 3 | Status: SHIPPED | OUTPATIENT
Start: 2024-02-19

## 2024-02-29 ENCOUNTER — NURSE ONLY (OUTPATIENT)
Facility: CLINIC | Age: 44
End: 2024-02-29

## 2024-02-29 DIAGNOSIS — Z12.11 SCREEN FOR COLON CANCER: Primary | ICD-10-CM

## 2024-02-29 NOTE — PROGRESS NOTES
GI Staff:  [facility] Colon Screening Orders    Please schedule: Colonoscopy [id number] / [id number] with MAC OR IV (if appropriate)    Please send split dose Golytely bowel prep     Diagnosis: Colon Screening Z12.11 /     >>>Please inform patient if new medications are started after scheduling procedure they need to call clinic to notify us.

## 2024-02-29 NOTE — PROGRESS NOTES
Called patient for scheduled telephone colon screening  Medications, pharmacy, and allergies verified with the patient.     Age 45-64 y/o (Y/N):   › GI MD preference:   › Insurance:  [employer]  › Last PCP Visit: 2/19/2024  › Last CBC drawn: pending  › H/W/BMI: 5'5\" / 126lbs / 20.97    Special comments/notes:    Telephone colon screening Questionnaires:  Yes No   Are you currently experiencing any new GI symptoms? [] [x]   If yes, symptom details:     Rectal Bleeding with or without bowel movements: [] [x]   Black stool: [] [x]   Dysphagia &Food feeling/getting stuck: [] [x]   Intractable Vomiting: [] [x]   Unexplained weight loss: [] [x]   First colonoscopy? [x] []   Family history of colon cancer? [] [x]   Any issues with anesthesia? [] [x]   If yes, explain details:      Personal history of Resp. Issues/Oxygen Use/JEFF/COPD? [] [x]   If yes, CPAP/BiPAP? [] [x]   History of devices Pacemaker/Defibrillator/Stents? [] [x]   History of Cardiac/CVA issues/(MI/Stroke):  [] [x]     Medication usage:  Yes  No   Anticoagulants:  Anticoagulant (Except Aspirin) ? Route to RN staff to obtain ordering provider orders [] [x]   Diabetic Meds:   PO DM Meds ? Hold day prior and day of procedure  Insulin ? Route to RN clinical staff to obtain provider orders  [] [x]   Weight loss meds (phentermine/Vyvanse/Saxsenda): [] [x]   Iron/Herbal/Multivitamin Supplement (RX/OTC):  Tumeric, magnesium, multivitamin [x] []   Usage of marijuana, CBD &/or vape products: [] [x]

## 2024-03-05 NOTE — PROGRESS NOTES
Scheduled for:  Colonoscopy [id number]  Provider Name:    Date:  Wed, 07/24/2024  Location:  [facility]  Sedation:  Mac  Time:  1:15pm (pt is aware to arrive at 12:15pm    Prep:  Golytely  Meds/Allergies Reconciled?:  Yes    Diagnosis with codes:  Colon Screening Z12.11 /   Was patient informed to call insurance with codes (Y/N):  yes     Referral sent?:  Referral was sent at the time of electronic surgical scheduling.    EM or [facility] notified?:  I sent an electronic request to Endo Scheduling and received a confirmation today.       Medication Orders:  None  Misc Orders:  None     Further instructions given by staff:  I discussed the prep instructions with the patient which she  verbally understood and is aware that I will send the instructions today.via Tarari

## 2024-03-07 ENCOUNTER — HOSPITAL ENCOUNTER (OUTPATIENT)
Dept: ULTRASOUND IMAGING | Facility: HOSPITAL | Age: 44
Discharge: HOME | End: 2024-03-07
Attending: OBSTETRICS & GYNECOLOGY
Payer: COMMERCIAL

## 2024-03-07 ENCOUNTER — HOSPITAL ENCOUNTER (OUTPATIENT)
Dept: MAMMOGRAPHY | Facility: HOSPITAL | Age: 44
Discharge: HOME | End: 2024-03-07
Attending: OBSTETRICS & GYNECOLOGY
Payer: COMMERCIAL

## 2024-03-07 ENCOUNTER — LAB ENCOUNTER (OUTPATIENT)
Dept: LAB | Facility: HOSPITAL | Age: 44
End: 2024-03-07
Attending: OBSTETRICS & GYNECOLOGY
Payer: COMMERCIAL

## 2024-03-07 ENCOUNTER — TELEPHONE (OUTPATIENT)
Dept: OBGYN CLINIC | Facility: CLINIC | Age: 44
End: 2024-03-07

## 2024-03-07 DIAGNOSIS — N60.01 BREAST CYST, RIGHT: Primary | ICD-10-CM

## 2024-03-07 DIAGNOSIS — N60.01 BREAST CYST, RIGHT: ICD-10-CM

## 2024-03-07 DIAGNOSIS — Z00.00 PHYSICAL EXAM: ICD-10-CM

## 2024-03-07 LAB
ALBUMIN SERPL-MCNC: 4.4 G/DL (ref 3.2–4.8)
ALBUMIN/GLOB SERPL: 1.5 {RATIO} (ref 1–2)
ALP LIVER SERPL-CCNC: 34 U/L
ALT SERPL-CCNC: 22 U/L
ANION GAP SERPL CALC-SCNC: 4 MMOL/L (ref 0–18)
AST SERPL-CCNC: 24 U/L (ref ?–34)
BASOPHILS # BLD AUTO: 0.05 X10(3) UL (ref 0–0.2)
BASOPHILS NFR BLD AUTO: 0.9 %
BILIRUB SERPL-MCNC: 0.5 MG/DL (ref 0.3–1.2)
BUN BLD-MCNC: 13 MG/DL (ref 9–23)
BUN/CREAT SERPL: 13.3 (ref 10–20)
CALCIUM BLD-MCNC: 9.5 MG/DL (ref 8.7–10.4)
CHLORIDE SERPL-SCNC: 107 MMOL/L (ref 98–112)
CHOLEST SERPL-MCNC: 202 MG/DL (ref ?–200)
CO2 SERPL-SCNC: 26 MMOL/L (ref 21–32)
CREAT BLD-MCNC: 0.98 MG/DL
DEPRECATED RDW RBC AUTO: 40.5 FL (ref 35.1–46.3)
EGFRCR SERPLBLD CKD-EPI 2021: 73 ML/MIN/1.73M2 (ref 60–?)
EOSINOPHIL # BLD AUTO: 0.13 X10(3) UL (ref 0–0.7)
EOSINOPHIL NFR BLD AUTO: 2.4 %
ERYTHROCYTE [DISTWIDTH] IN BLOOD BY AUTOMATED COUNT: 11.7 % (ref 11–15)
FASTING PATIENT LIPID ANSWER: YES
FASTING STATUS PATIENT QL REPORTED: YES
GLOBULIN PLAS-MCNC: 2.9 G/DL (ref 2.8–4.4)
GLUCOSE BLD-MCNC: 97 MG/DL (ref 70–99)
HCT VFR BLD AUTO: 38.4 %
HDLC SERPL-MCNC: 88 MG/DL (ref 40–59)
HGB BLD-MCNC: 13.3 G/DL
IMM GRANULOCYTES # BLD AUTO: 0.01 X10(3) UL (ref 0–1)
IMM GRANULOCYTES NFR BLD: 0.2 %
LDLC SERPL CALC-MCNC: 92 MG/DL (ref ?–100)
LYMPHOCYTES # BLD AUTO: 1.78 X10(3) UL (ref 1–4)
LYMPHOCYTES NFR BLD AUTO: 33.2 %
MCH RBC QN AUTO: 32.8 PG (ref 26–34)
MCHC RBC AUTO-ENTMCNC: 34.6 G/DL (ref 31–37)
MCV RBC AUTO: 94.8 FL
MONOCYTES # BLD AUTO: 0.4 X10(3) UL (ref 0.1–1)
MONOCYTES NFR BLD AUTO: 7.5 %
NEUTROPHILS # BLD AUTO: 2.99 X10 (3) UL (ref 1.5–7.7)
NEUTROPHILS # BLD AUTO: 2.99 X10(3) UL (ref 1.5–7.7)
NEUTROPHILS NFR BLD AUTO: 55.8 %
NONHDLC SERPL-MCNC: 114 MG/DL (ref ?–130)
OSMOLALITY SERPL CALC.SUM OF ELEC: 284 MOSM/KG (ref 275–295)
PLATELET # BLD AUTO: 209 10(3)UL (ref 150–450)
POTASSIUM SERPL-SCNC: 4.7 MMOL/L (ref 3.5–5.1)
PROT SERPL-MCNC: 7.3 G/DL (ref 5.7–8.2)
RBC # BLD AUTO: 4.05 X10(6)UL
SODIUM SERPL-SCNC: 137 MMOL/L (ref 136–145)
TRIGL SERPL-MCNC: 128 MG/DL (ref 30–149)
TSI SER-ACNC: 1.62 MIU/ML (ref 0.55–4.78)
VLDLC SERPL CALC-MCNC: 21 MG/DL (ref 0–30)
WBC # BLD AUTO: 5.4 X10(3) UL (ref 4–11)

## 2024-03-07 PROCEDURE — 84443 ASSAY THYROID STIM HORMONE: CPT

## 2024-03-07 PROCEDURE — 77066 DX MAMMO INCL CAD BI: CPT | Performed by: OBSTETRICS & GYNECOLOGY

## 2024-03-07 PROCEDURE — 77062 BREAST TOMOSYNTHESIS BI: CPT | Performed by: OBSTETRICS & GYNECOLOGY

## 2024-03-07 PROCEDURE — 80053 COMPREHEN METABOLIC PANEL: CPT

## 2024-03-07 PROCEDURE — 85025 COMPLETE CBC W/AUTO DIFF WBC: CPT

## 2024-03-07 PROCEDURE — 76642 ULTRASOUND BREAST LIMITED: CPT | Performed by: OBSTETRICS & GYNECOLOGY

## 2024-03-07 PROCEDURE — 36415 COLL VENOUS BLD VENIPUNCTURE: CPT

## 2024-03-07 PROCEDURE — 80061 LIPID PANEL: CPT

## 2024-03-08 NOTE — TELEPHONE ENCOUNTER
Pt informed of results and recs for follow up. Pt states when she was at the mammogram she was told she also needed a surgical consult.  Message to CAP to see if CAP feels this is required.  Results do not mention a surgical consult, but they do recommend pt see high risk breast clinic due to family history.

## 2024-03-08 NOTE — TELEPHONE ENCOUNTER
----- Message from Adrienne Jacinto MD sent at 3/7/2024  6:05 PM CST -----  Benign appearing findings on mammogram, repeat bilateral diagnostic mammogram and right breast ultrasound in 6 months,please order and call pt

## 2024-03-08 NOTE — PROGRESS NOTES
Benign appearing findings on mammogram, repeat bilateral diagnostic mammogram and right breast ultrasound in 6 months,please order and call pt

## 2024-05-10 RX ORDER — DROSPIRENONE AND ETHINYL ESTRADIOL 0.02-3(28)
1 KIT ORAL DAILY
Qty: 84 TABLET | Refills: 3 | Status: CANCELLED | OUTPATIENT
Start: 2024-05-10 | End: 2025-05-10

## 2024-05-10 NOTE — TELEPHONE ENCOUNTER
Requested Prescriptions     Pending Prescriptions Disp Refills    Drospirenone-Ethinyl Estradiol (YESSI) 3-0.02 MG Oral Tab 84 tablet 3     Sig: Take 1 tablet by mouth daily.     Last annual 6/8/23  Last filled 6/19/23 x 1 year  Pap UTD  Mammo UTD    Next annual due June 2024. Mychart sent.

## 2024-05-11 RX ORDER — DROSPIRENONE AND ETHINYL ESTRADIOL 0.02-3(28)
1 KIT ORAL DAILY
Qty: 84 TABLET | Refills: 0 | Status: SHIPPED | OUTPATIENT
Start: 2024-05-11 | End: 2025-05-11

## 2024-05-11 NOTE — TELEPHONE ENCOUNTER
Last annual - 6/8/2023  Last pap - 6/8/2023, negative  Last mammogram - 3/7/2024, Category 3    Next annual - 6/12/2024    Refills sent to cover until appointment.

## 2024-06-12 ENCOUNTER — OFFICE VISIT (OUTPATIENT)
Dept: OBGYN CLINIC | Facility: CLINIC | Age: 44
End: 2024-06-12

## 2024-06-12 VITALS
DIASTOLIC BLOOD PRESSURE: 65 MMHG | HEIGHT: 65 IN | WEIGHT: 127 LBS | SYSTOLIC BLOOD PRESSURE: 109 MMHG | BODY MASS INDEX: 21.16 KG/M2 | HEART RATE: 69 BPM

## 2024-06-12 DIAGNOSIS — Z01.419 WELL WOMAN EXAM WITH ROUTINE GYNECOLOGICAL EXAM: Primary | ICD-10-CM

## 2024-06-12 DIAGNOSIS — Z11.3 SCREENING EXAMINATION FOR STI: ICD-10-CM

## 2024-06-12 DIAGNOSIS — R92.8 ABNORMAL MAMMOGRAM OF BOTH BREASTS: ICD-10-CM

## 2024-06-12 DIAGNOSIS — Z30.41 ORAL CONTRACEPTIVE PILL SURVEILLANCE: ICD-10-CM

## 2024-06-12 PROCEDURE — 99396 PREV VISIT EST AGE 40-64: CPT | Performed by: NURSE PRACTITIONER

## 2024-06-12 RX ORDER — DROSPIRENONE AND ETHINYL ESTRADIOL 0.02-3(28)
1 KIT ORAL DAILY
Qty: 84 TABLET | Refills: 3 | Status: SHIPPED | OUTPATIENT
Start: 2024-06-12 | End: 2025-06-12

## 2024-06-12 NOTE — PROGRESS NOTES
[facility]    Obstetrics and Gynecology    Chief Complaint   Patient presents with    Gyn Exam     ANNUAL EXAM / BCP REFILL       Kayla Ardon is a 43 year old female  Patient's last menstrual period was 2024 (approximate). presenting for annual gynecology exam.  Last seen a year ago with Dr. Jacinto.    Hx of right breast cyst that notices increased in pain with her period    She is on ocps since last year. She reports sporadic periods on ocps - light flow, restarted ocps last year due to heavy periods. Happy with ocps, desires to continue.    She and her  are . No new partners.desires sti testing.    Some vaginal itching used a new razor when shaved.    Pap:2023 NILM, negative human papillomavirus; due in 3 years   2021 colpo DEIRDRE 1   - ASCUS, human papillomavirus +    Contraception:ocps  Mammo: 3/2024 needs 6 month bilateral diagnostic mammogram follow up.    OBSTETRICS HISTORY:  OB History    Para Term  AB Living   3 2 2 0 1 2   SAB IAB Ectopic Multiple Live Births   1 0 0 0 2       GYNE HISTORY:  Menarche: 10 (2024  1:06 PM)  Period Cycle (Days): monthly (2024  1:06 PM)  Period Duration (Days): 5 (2024  1:06 PM)  Period Flow: heavy first 2-3 day-light (2024  1:06 PM)  Use of Birth Control (if yes, specify type): OCP (2024  1:06 PM)  Hx Prior Abnormal Pap: Yes (2024  1:06 PM)  Pap Date: 23 (2024  1:06 PM)  Pap Result Notes: NEG PAP / NEG HPV (2024  1:06 PM)  Follow Up Recommendation: MAMMO 3-7-24 PENG BENIGN (2024  1:06 PM)      History   Sexual Activity    Sexual activity: Not Currently    Partners: Male    Birth control/ protection: Condom           Latest Ref Rng & Units 2023     1:59 PM 2020    11:11 AM   RECENT PAP RESULTS   Thinprep Pap Negative for intraepithelial lesion or malignancy Negative for intraepithelial lesion or malignancy  Atypical squamous cells of undetermined  significance (ASC-US)    HPV Negative Negative  Positive          MEDICAL HISTORY:  Past Medical History:    Depression with anxiety    on lexapro     Past Surgical History:   Procedure Laterality Date      X2    2009       SOCIAL HISTORY:  Social History     Socioeconomic History    Marital status:      Spouse name: Not on file    Number of children: Not on file    Years of education: Not on file    Highest education level: Not on file   Occupational History    Not on file   Tobacco Use    Smoking status: Former     Current packs/day: 0.00     Types: Cigarettes     Quit date: 1998     Years since quittin.4    Smokeless tobacco: Never   Substance and Sexual Activity    Alcohol use: Yes     Comment: 3 times a week    Drug use: Not Currently    Sexual activity: Not Currently     Partners: Male     Birth control/protection: Condom   Other Topics Concern    Grew up on a farm Not Asked    History of tanning Not Asked    Outdoor occupation Not Asked    Pt has a pacemaker No    Pt has a defibrillator No    Breast feeding Not Asked    Reaction to local anesthetic No    Caffeine Concern Not Asked    Exercise Not Asked    Seat Belt Not Asked    Special Diet Not Asked    Stress Concern Not Asked    Weight Concern Not Asked   Social History Narrative    Not on file     Social Determinants of Health     Financial Resource Strain: Not on file   Food Insecurity: Not on file   Transportation Needs: Not on file   Physical Activity: Not on file   Stress: Not on file   Social Connections: Not on file   Housing Stability: Not on file         Depression Screening (PHQ-2/PHQ-9): Over the LAST 2 WEEKS   Little interest or pleasure in doing things (over the last two weeks)?: Not at all    Feeling down, depressed, or hopeless (over the last two weeks)?: Not at all    PHQ-2 SCORE: 0           FAMILY HISTORY:  Family History   Problem Relation Age of Onset    Cancer Maternal Grandfather         bone, bladder and  prostate cancer    Melanoma Maternal Grandfather     Ovarian Cancer Maternal Aunt         30's    Hypertension Paternal Grandfather     Diabetes Paternal Grandfather     Heart Disorder Paternal Grandfather     Non Melanoma Mother     Non Melanoma Father     Non Melanoma Maternal Aunt     Non Melanoma Maternal Uncle     Hypertension Paternal Grandmother     Colon Cancer Neg     Breast Cancer Neg     Prostate Cancer Neg     Endometrial Cancer Neg        MEDICATIONS:    Current Outpatient Medications:     Drospirenone-Ethinyl Estradiol (YESSI) 3-0.02 MG Oral Tab, Take 1 tablet by mouth daily., Disp: 84 tablet, Rfl: 3    escitalopram 20 MG Oral Tab, Take 1 tablet (20 mg total) by mouth daily., Disp: 90 tablet, Rfl: 3    Turmeric (QC TUMERIC COMPLEX OR), , Disp: , Rfl:     Magnesium 400 MG Oral Cap, , Disp: , Rfl:     Multiple Vitamins-Minerals (MULTI-VITAMIN/MINERALS) Oral Tab, Take 1 tablet by mouth daily., Disp: , Rfl:     Magnesium Cl-Calcium Carbonate 71.5-119 MG Oral Tab EC, Take 2 tablets by mouth daily., Disp: , Rfl:     Vitamin D3, Cholecalciferol, 1000 UNITS Oral Cap, Take 1,000 Units by mouth daily. (Patient not taking: Reported on 10/5/2022), Disp: , Rfl:     ALLERGIES:  No Known Allergies      Review of Systems:  Constitutional:  Denies fatigue, night sweats, hot flashes  Eyes:  denies blurred or double vision  Cardiovascular:  denies chest pain or palpitations  Respiratory:  denies shortness of breath  Gastrointestinal:  denies heartburn, abdominal pain, diarrhea or constipation  Genitourinary:  denies dysuria, incontinence, abnormal vaginal discharge, vaginal itching,   Musculoskeletal:  denies back pain   Skin/Breast:  Denies any breast pain, lumps, or discharge.   Neurological:  denies headaches, extremity weakness or numbness.  Psychiatric: denies depression or anxiety.  Endocrine:   denies excessive thirst or urination.  Heme/Lymph:  denies history of anemia, easy bruising or bleeding.      PHYSICAL  EXAM:     Vitals:    06/12/24 1314   BP: 109/65   Pulse: 69   Weight: 127 lb (57.6 kg)   Height: 5' 5\" (1.651 m)       Body mass index is 21.13 kg/m².     Patient offered chaperone, patient declined    Constitutional: well developed, well nourished  Psychiatric:  Oriented to time, place, person and situation. Appropriate mood and affect  Head/Face: normocephalic  Neck/Thyroid: thyroid symmetric, no thyromegaly, no nodules, no adenopathy  Lymphatic:no abnormal supraclavicular or axillary adenopathy is noted  Breast: normal without palpable masses, tenderness, asymmetry, nipple discharge, nipple retraction or skin changes  Abdomen:  soft, nontender, nondistended, no masses  Skin/Hair: no unusual rashes or bruises  Extremities: no edema, no cyanosis    Pelvic Exam:  External Genitalia: normal appearance, hair distribution, and no lesions  Urethral Meatus:  normal in size, location, without lesions and prolapse  Bladder:  No fullness, masses or tenderness  Vagina:  Normal appearance without lesions, no abnormal discharge  Cervix:  Normal without tenderness on motion  Uterus: normal in size, contour, position, mobility, without tenderness  Adnexa: normal without masses or tenderness  Perineum: normal  Anus: no hemorroids     Assessment & Plan:    ICD-10-CM    1. Well woman exam with routine gynecological exam  Z01.419       2. Abnormal mammogram of both breasts  R92.8 [facility] MARKEL 2D+3D DIAGNOSTIC [facility]  BILAT (FOO=[id number])      3. Screening examination for STI  Z11.3 Chlamydia/Gc Amplification     Trichomonas vaginalis, VIPIN (Vaginal/Cervical)      4. Oral contraceptive pill surveillance  Z30.41          Reviewed ASCCP guidelines with the patient   Pap due in 2026 due to rangel 1 in 2021  Contraception: Using ocps for heavy periods. Desires to continue. Reviewed risks and benefits of oral contraceptives. Reviewed to call or go to ER if increased headaches, SOB, CP or leg pain with or without swelling.  Reviewed when to start  OCPs, how to take OCPs, and when to use back up contraception  Patient verbalized understanding   --Encouraged condoms to prevent STD exposure - desires vaginal sti testing  Needs repeat bilateral diagnostic mammogram in 9/2024 - ordered  Reviewed current guidelines with the patient and to start Mammograms at age 40  Reviewed monthly self breast exams with the patient   Discussed diet, exercise, MVIs with Ca/Vit D  Follow up in 1 yr for SHIRA Wilson    This note was prepared using Dragon Medical voice recognition dictation software. As a result errors may occur. When identified these errors have been corrected. While every attempt is made to correct errors during dictation discrepancies may still exist.

## 2024-06-13 LAB
C TRACH DNA SPEC QL NAA+PROBE: NEGATIVE
N GONORRHOEA DNA SPEC QL NAA+PROBE: NEGATIVE

## 2024-06-14 LAB — T VAGINALIS RRNA SPEC QL NAA+PROBE: NEGATIVE

## 2024-07-09 ENCOUNTER — OFFICE VISIT (OUTPATIENT)
Dept: OTOLARYNGOLOGY | Facility: CLINIC | Age: 44
End: 2024-07-09

## 2024-07-09 DIAGNOSIS — Z30.41 ORAL CONTRACEPTIVE PILL SURVEILLANCE: ICD-10-CM

## 2024-07-09 DIAGNOSIS — H93.8X2 EAR FULLNESS, LEFT: ICD-10-CM

## 2024-07-09 DIAGNOSIS — M26.609 TMJ (TEMPOROMANDIBULAR JOINT DISORDER): Primary | ICD-10-CM

## 2024-07-09 DIAGNOSIS — H61.21 IMPACTED CERUMEN, RIGHT EAR: ICD-10-CM

## 2024-07-09 RX ORDER — DROSPIRENONE AND ETHINYL ESTRADIOL 0.02-3(28)
1 KIT ORAL DAILY
Qty: 84 TABLET | Refills: 3 | Status: CANCELLED | OUTPATIENT
Start: 2024-07-09 | End: 2025-07-09

## 2024-07-09 RX ORDER — CYCLOBENZAPRINE HCL 5 MG
5 TABLET ORAL NIGHTLY
Qty: 14 TABLET | Refills: 0 | Status: SHIPPED | OUTPATIENT
Start: 2024-07-09 | End: 2024-07-23

## 2024-07-09 RX ORDER — CELECOXIB 200 MG/1
200 CAPSULE ORAL 2 TIMES DAILY
Qty: 28 CAPSULE | Refills: 0 | Status: SHIPPED | OUTPATIENT
Start: 2024-07-09 | End: 2024-07-23

## 2024-07-09 NOTE — PROGRESS NOTES
Kayla Ardon is a 43 year old female.   Chief Complaint   Patient presents with    Ear Problem     Reports muffled hearing in left ear, ear cleaning      HPI:   43-year-old presents with about 1 year of ear fullness on the left side.  She says that she has tried Sudafed which has not helped.  She says her hearing feels muffled  Current Outpatient Medications   Medication Sig Dispense Refill    cyclobenzaprine 5 MG Oral Tab Take 1 tablet (5 mg total) by mouth nightly for 14 days. 14 tablet 0    celecoxib 200 MG Oral Cap Take 1 capsule (200 mg total) by mouth 2 (two) times daily for 14 days. 28 capsule 0    Drospirenone-Ethinyl Estradiol (YESSI) 3-0.02 MG Oral Tab Take 1 tablet by mouth daily. 84 tablet 3    escitalopram 20 MG Oral Tab Take 1 tablet (20 mg total) by mouth daily. 90 tablet 3    Turmeric (QC TUMERIC COMPLEX OR)       Magnesium 400 MG Oral Cap       Multiple Vitamins-Minerals (MULTI-VITAMIN/MINERALS) Oral Tab Take 1 tablet by mouth daily.      Magnesium Cl-Calcium Carbonate 71.5-119 MG Oral Tab EC Take 2 tablets by mouth daily.      Vitamin D3, Cholecalciferol, 1000 UNITS Oral Cap Take 1,000 Units by mouth daily. (Patient not taking: Reported on 10/5/2022)        Past Medical History:    Depression with anxiety    on lexapro      Social History:  Social History     Socioeconomic History    Marital status:    Tobacco Use    Smoking status: Former     Current packs/day: 0.00     Types: Cigarettes     Quit date: 1998     Years since quittin.5    Smokeless tobacco: Never   Substance and Sexual Activity    Alcohol use: Yes     Comment: 3 times a week    Drug use: Not Currently    Sexual activity: Not Currently     Partners: Male     Birth control/protection: Condom   Other Topics Concern    Pt has a pacemaker No    Pt has a defibrillator No    Reaction to local anesthetic No      Past Surgical History:   Procedure Laterality Date      X2    2009         EXAM:   LMP  06/12/2024 (Approximate)     System Details   Skin Inspection - Normal.   Constitutional Overall appearance - Normal.   Head/Face Symmetric, TMJ clicking on the left is significant   Eyes EOMI, PERRL   Right ear:  Canal clear, TM intact, no AMELIA   Left ear:  Canal clear, TM intact, no AMELIA   Nose: Septum midline, inferior turbinates not enlarged, nasal valves without collapse    Oral cavity/Oropharynx: No lesions or masses on inspection or palpation, tonsils symmetric    Neck: Soft without LAD, thyroid not enlarged  Voice clear/ no stridor   Other:      SCOPES AND PROCEDURES:     Canals:  Right: Canal with cerumen preventing adequate view of TM, debrided with instrumentation    Tympanic Membranes:  Right: Normal tympanic membrane.     TM Visualized Method:   Right TM examined via otomicroscopy.      PROCEDURE:   Removal of cerumen impaction   The cerumen impaction was completely removed on the right side using microscopy as necessary.   Removal was completed by using a curette and suction.     AUDIOGRAM AND IMAGING:         IMPRESSION:   1. TMJ (temporomandibular joint disorder)    2. Impacted cerumen, right ear    3. Ear fullness, left       Recommendations:  -43-year-old with 1 year of ear fullness on the left side.  Normal ear exam with good auto insufflation indicating good eustachian tube function.  Did have significant clicking of her jaw joint on the left side with history of bruxism  -Will trial a muscle relaxant and anti-inflammatory for possible chronic TMD in the setting of bruxism that is causing this ear fullness  -She is also going to be meeting with her dentist tomorrow and discussed bring this issue up as well to consider a nightguard  -If she continues to have issues over the next 2 to 3 weeks she can return may obtain a formal audiogram as the next step    The patient indicates understanding of these issues and agrees to the plan.  Consult from Dr Jenkins regarding ear evaluation    Mac Moses  MD  7/9/2024  11:46 AM

## 2024-07-10 RX ORDER — CYCLOBENZAPRINE HCL 5 MG
5 TABLET ORAL NIGHTLY
Qty: 14 TABLET | Refills: 0 | OUTPATIENT
Start: 2024-07-10 | End: 2024-07-24

## 2024-07-10 RX ORDER — CELECOXIB 200 MG/1
200 CAPSULE ORAL 2 TIMES DAILY
Qty: 28 CAPSULE | Refills: 0 | OUTPATIENT
Start: 2024-07-10 | End: 2024-07-24

## 2024-07-10 NOTE — TELEPHONE ENCOUNTER
Received a refill request for Celecoxib and Cyclobenzaprine, patient just saw Dr. Moses yesterday and was prescribed a trial of these medications.  Will deny this request.

## 2024-07-24 ENCOUNTER — HOSPITAL ENCOUNTER (OUTPATIENT)
Facility: HOSPITAL | Age: 44
Setting detail: HOSPITAL OUTPATIENT SURGERY
Discharge: HOME | End: 2024-07-24
Attending: INTERNAL MEDICINE | Admitting: INTERNAL MEDICINE
Payer: COMMERCIAL

## 2024-07-24 ENCOUNTER — ANESTHESIA EVENT (OUTPATIENT)
Dept: ENDOSCOPY | Facility: HOSPITAL | Age: 44
End: 2024-07-24
Payer: COMMERCIAL

## 2024-07-24 ENCOUNTER — ANESTHESIA (OUTPATIENT)
Dept: ENDOSCOPY | Facility: HOSPITAL | Age: 44
End: 2024-07-24
Payer: COMMERCIAL

## 2024-07-24 VITALS
HEART RATE: 53 BPM | RESPIRATION RATE: 15 BRPM | SYSTOLIC BLOOD PRESSURE: 114 MMHG | BODY MASS INDEX: 20.83 KG/M2 | DIASTOLIC BLOOD PRESSURE: 68 MMHG | HEIGHT: 65 IN | WEIGHT: 125 LBS | OXYGEN SATURATION: 100 %

## 2024-07-24 DIAGNOSIS — Z12.11 SCREEN FOR COLON CANCER: ICD-10-CM

## 2024-07-24 LAB — B-HCG UR QL: NEGATIVE

## 2024-07-24 PROCEDURE — 0DJD8ZZ INSPECTION OF LOWER INTESTINAL TRACT, VIA NATURAL OR ARTIFICIAL OPENING ENDOSCOPIC: ICD-10-PCS | Performed by: INTERNAL MEDICINE

## 2024-07-24 PROCEDURE — 45378 DIAGNOSTIC COLONOSCOPY: CPT | Performed by: INTERNAL MEDICINE

## 2024-07-24 RX ORDER — LIDOCAINE HYDROCHLORIDE 10 MG/ML
INJECTION, SOLUTION EPIDURAL; INFILTRATION; INTRACAUDAL; PERINEURAL AS NEEDED
Status: DISCONTINUED | OUTPATIENT
Start: 2024-07-24 | End: 2024-07-24 | Stop reason: SURG

## 2024-07-24 RX ORDER — SODIUM CHLORIDE, SODIUM LACTATE, POTASSIUM CHLORIDE, CALCIUM CHLORIDE 600; 310; 30; 20 MG/100ML; MG/100ML; MG/100ML; MG/100ML
INJECTION, SOLUTION INTRAVENOUS CONTINUOUS
Status: DISCONTINUED | OUTPATIENT
Start: 2024-07-24 | End: 2024-07-24

## 2024-07-24 RX ORDER — SODIUM CHLORIDE, SODIUM LACTATE, POTASSIUM CHLORIDE, CALCIUM CHLORIDE 600; 310; 30; 20 MG/100ML; MG/100ML; MG/100ML; MG/100ML
INJECTION, SOLUTION INTRAVENOUS CONTINUOUS
OUTPATIENT
Start: 2024-07-24

## 2024-07-24 RX ORDER — NALOXONE HYDROCHLORIDE 0.4 MG/ML
0.08 INJECTION, SOLUTION INTRAMUSCULAR; INTRAVENOUS; SUBCUTANEOUS ONCE AS NEEDED
OUTPATIENT
Start: 2024-07-24 | End: 2024-07-24

## 2024-07-24 RX ADMIN — LIDOCAINE HYDROCHLORIDE 50 MG: 10 INJECTION, SOLUTION EPIDURAL; INFILTRATION; INTRACAUDAL; PERINEURAL at 13:14:00

## 2024-07-24 RX ADMIN — SODIUM CHLORIDE, SODIUM LACTATE, POTASSIUM CHLORIDE, CALCIUM CHLORIDE: 600; 310; 30; 20 INJECTION, SOLUTION INTRAVENOUS at 12:30:00

## 2024-07-24 NOTE — H&P
History & Physical Examination    Patient Name: Kayla Ardon  MRN: [id number]  [facility]: [id number]  YOB: 1980    Diagnosis: CRC screening    Medications Prior to Admission   Medication Sig Dispense Refill Last Dose    Drospirenone-Ethinyl Estradiol (YESSI) 3-0.02 MG Oral Tab Take 1 tablet by mouth daily. 84 tablet 3 2024 at PM    escitalopram 20 MG Oral Tab Take 1 tablet (20 mg total) by mouth daily. 90 tablet 3 2024 at PM    Magnesium 400 MG Oral Cap    2024    Multiple Vitamins-Minerals (MULTI-VITAMIN/MINERALS) Oral Tab Take 1 tablet by mouth daily.   2024    [] cyclobenzaprine 5 MG Oral Tab Take 1 tablet (5 mg total) by mouth nightly for 14 days. 14 tablet 0     [] celecoxib 200 MG Oral Cap Take 1 capsule (200 mg total) by mouth 2 (two) times daily for 14 days. 28 capsule 0     Turmeric (QC TUMERIC COMPLEX OR)        Magnesium Cl-Calcium Carbonate 71.5-119 MG Oral Tab EC Take 2 tablets by mouth daily.       Vitamin D3, Cholecalciferol, 1000 UNITS Oral Cap Take 1,000 Units by mouth daily. (Patient not taking: Reported on 10/5/2022)        Current Facility-Administered Medications   Medication Dose Route Frequency    lactated ringers infusion   Intravenous Continuous       Allergies: No Known Allergies    Past Medical History:    Anesthesia complication    needed more sedation with C-sections    Anxiety state    Depression    Depression with anxiety    on lexapro     Past Surgical History:   Procedure Laterality Date      X2    2009     Family History   Problem Relation Age of Onset    Cancer Maternal Grandfather         bone, bladder and prostate cancer    Melanoma Maternal Grandfather     Ovarian Cancer Maternal Aunt         30's    Hypertension Paternal Grandfather     Diabetes Paternal Grandfather     Heart Disorder Paternal Grandfather     Non Melanoma Mother     Non Melanoma Father     Non Melanoma Maternal Aunt     Non Melanoma Maternal Uncle      Hypertension Paternal Grandmother     Colon Cancer Neg     Breast Cancer Neg     Prostate Cancer Neg     Endometrial Cancer Neg      Social History     Tobacco Use    Smoking status: Former     Current packs/day: 0.00     Types: Cigarettes     Quit date: 1998     Years since quittin.5    Smokeless tobacco: Never   Substance Use Topics    Alcohol use: Yes     Comment: occ         SYSTEM Check if Review is Normal Check if Physical Exam is Normal If not normal, please explain:   HEENT [X ] [ X]    NECK  [X ] [ X]    HEART [X ] [ X]    LUNGS [X ] [ X]    ABDOMEN [X ] [ X]    EXTREMITIES [X ] [ X]    OTHER        I have discussed the risks and benefits and alternatives of the procedure with the patient/family.  They understand and agree to proceed with plan of care.   I have reviewed the History and Physical done within the last 30 days.  Any changes noted above.    Marnie Lazo MD  [facility] - Gastroenterology  2024

## 2024-07-24 NOTE — OPERATIVE REPORT
COLONOSCOPY REPORT    Kayla Ardon     11/3/1980 Age 43 year old   PCP Jagruti Jenkins MD Endoscopist Marnie Lazo MD     Date of procedure: 24    Procedure: Colonoscopy w/ brush/wash    Pre-operative diagnosis: CRC screening    Post-operative diagnosis: right colon diverticulosis, internal hemorrhoids    Medications: MAC    Withdrawal time: 23 minutes    Procedure:  Informed consent was obtained from the patient after the risks of the procedure were discussed, including but not limited to bleeding, perforation, aspiration, infection, or possibility of a missed lesion. After discussions of the risks/benefits and alternatives to this procedure, as well as the planned sedation, the patient was placed in the left lateral decubitus position and begun on continuous blood pressure pulse oximetry and EKG monitoring and this was maintained throughout the procedure. Once an adequate level of sedation was obtained a digital rectal exam was completed. Then the lubricated tip of the [id number] diagnostic video colonoscope was inserted and advanced without difficulty to the cecum using the CO2 insufflation technique. The cecum was identified by localizing the trifold, the appendix and the ileocecal valve. Withdrawal was begun with thorough washing and careful examination of the colonic walls and folds. A routine second examination of the cecum/ascending colon was performed. Photodocumentation was obtained. The bowel prep was good. Views of the colon were good with washing. I then carefully withdrew the instrument from the patient who tolerated the procedure well.     Complications: none.    Findings:   1. No polyps were noted.      2. Diverticulosis: mild in the right colon.    3. Terminal ileum: the visualized mucosa appeared normal.    4. A retroflexed view of the rectum revealed small internal hemorrhoids.    5. The colonic mucosa throughout the colon was carefully examined and showed normal  vascular pattern, without evidence of angioectasias or inflammation.     6. ERIKA: normal rectal tone, no masses palpated.     Impression:   Mild right colon diverticulosis.  Small internal hemorrhoids.  Otherwise unremarkable ileocolonoscopy.     Recommend:  Repeat colonoscopy is recommended in 7-10 years.  If new signs or symptoms develop, colonoscopy may need to be repeated sooner.   High fiber diet.  Monitor for blood in the stool. If having more than just tinge of blood, call office or go to the ER.    >>>If tissue was obtained and you have not received your pathology results either by phone or letter within 2 weeks, please call our office at [phone].    Specimens: none.     Blood loss: <1 ml

## 2024-07-24 NOTE — DISCHARGE INSTRUCTIONS
Home Care Instructions for Colonoscopy with Sedation    Diet:  - Resume your regular diet as tolerated unless otherwise instructed.  - Start with light meals to minimize bloating.  - Do not drink alcohol today.    Medication:  - If you have questions about resuming your normal medications, please contact your Primary Care Physician.    Activities:  - Take it easy today. Do not return to work today.  - Do not drive today.  - Do not operate any machinery today (including kitchen equipment).    Colonoscopy:  - You may notice some rectal \"spotting\" (a little blood on the toilet tissue) for a day or two after the exam. This is normal.  - If you experience any rectal bleeding (not spotting), persistent tenderness or sharp severe abdominal pains, oral temperature over 100 degrees Fahrenheit, light-headedness or dizziness, or any other problems, contact your doctor.    **If unable to reach your doctor, please go to the [facility] Emergency Room**    - Your referring physician will receive a full report of your examination.  - If you do not hear from your doctor's office within two weeks of your biopsy, please call them for your results.    You may be able to see your laboratory results in [employer] between 4 and 7 business days.  In some cases, your physician may not have viewed the results before they are released to [employer].  If you have questions regarding your results contact the physician who ordered the test/exam by phone or via [employer] by choosing \"Ask a Medical Question.\"Home Care Instructions for Colonoscopy with Sedation    Diet:  - Resume your regular diet as tolerated unless otherwise instructed.  - Start with light meals to minimize bloating.  - Do not drink alcohol today.    Medication:  - If you have questions about resuming your normal medications, please contact your Primary Care Physician.    Activities:  - Take it easy today. Do not return to work today.  - Do not drive today.  - Do not  operate any machinery today (including kitchen equipment).  -   Do not make any critical decisions or sign any paperwork.  - Do not exercise today.    Colonoscopy:  - You may notice some rectal \"spotting\" (a little blood on the toilet tissue) for a day or two after the exam. This is normal.  - If you experience any rectal bleeding (not spotting), persistent tenderness or sharp severe abdominal pains, oral temperature over 100 degrees Fahrenheit, light-headedness or dizziness, or any other problems, contact your doctor.      **If unable to reach your doctor, please go to the [facility] Emergency Room**    - Your referring physician will receive a full report of your examination.  - If you do not hear from your doctor's office within two weeks of your biopsy, please call them for your results.    You may be able to see your laboratory results in [employer] between 4 and 7 business days.  In some cases, your physician may not have viewed the results before they are released to [employer].  If you have questions regarding your results contact the physician who ordered the test/exam by phone or via [employer] by choosing \"Ask a Medical Question.\"

## 2024-07-24 NOTE — ANESTHESIA PREPROCEDURE EVALUATION
Anesthesia PreOp Note    HPI:     Kayla Ardon is a 43 year old female who presents for preoperative consultation requested by: Marnie Lazo MD    Date of Surgery: 2024    Procedure(s):  COLONOSCOPY  Indication: Screen for colon cancer    Relevant Problems   No relevant active problems       NPO:  Last Liquid Consumption Date: 24  Last Liquid Consumption Time: 0945  Last Solid Consumption Date: 24  Last Solid Consumption Time: 2100  Last Liquid Consumption Date: 24          History Review:  Patient Active Problem List    Diagnosis Date Noted   • Abnormal uterine bleeding due to endometrial polyp 2020   • Anxiety 2019   • Acne vulgaris 10/29/2015   • Seborrheic dermatitis, unspecified 2014   • Benign neoplasm of skin of lower limb, including hip 2014   • Benign neoplasm of skin of upper limb, including shoulder 2014   • Dyschromia 2014   • Seborrheic dermatitis 2014       Past Medical History:   • Anesthesia complication    needed more sedation with C-sections, \"weird laughing, crying\" episode with epidural per pt   • Anxiety state   • Depression   • Depression with anxiety    on lexapro       Past Surgical History:   Procedure Laterality Date   •   X2    2009       Medications Prior to Admission   Medication Sig Dispense Refill Last Dose   • Drospirenone-Ethinyl Estradiol (YESSI) 3-0.02 MG Oral Tab Take 1 tablet by mouth daily. 84 tablet 3 2024 at PM   • escitalopram 20 MG Oral Tab Take 1 tablet (20 mg total) by mouth daily. 90 tablet 3 2024 at PM   • Magnesium 400 MG Oral Cap    2024   • Multiple Vitamins-Minerals (MULTI-VITAMIN/MINERALS) Oral Tab Take 1 tablet by mouth daily.   2024   • [] cyclobenzaprine 5 MG Oral Tab Take 1 tablet (5 mg total) by mouth nightly for 14 days. 14 tablet 0    • [] celecoxib 200 MG Oral Cap Take 1 capsule (200 mg total) by mouth 2 (two) times daily for 14 days. 28  capsule 0    • Turmeric (QC TUMERIC COMPLEX OR)       • Magnesium Cl-Calcium Carbonate 71.5-119 MG Oral Tab EC Take 2 tablets by mouth daily.      • Vitamin D3, Cholecalciferol, 1000 UNITS Oral Cap Take 1,000 Units by mouth daily. (Patient not taking: Reported on 10/5/2022)        Current Facility-Administered Medications Ordered in Epic   Medication Dose Route Frequency Provider Last Rate Last Admin   • lactated ringers infusion   Intravenous Continuous Marnie Lazo MD 20 mL/hr at 24 1241 New Bag at 24 1241     No current [facility]-ordered outpatient medications on file.       No Known Allergies    Family History   Problem Relation Age of Onset   • Cancer Maternal Grandfather         bone, bladder and prostate cancer   • Melanoma Maternal Grandfather    • Ovarian Cancer Maternal Aunt         30's   • Hypertension Paternal Grandfather    • Diabetes Paternal Grandfather    • Heart Disorder Paternal Grandfather    • Non Melanoma Mother    • Non Melanoma Father    • Non Melanoma Maternal Aunt    • Non Melanoma Maternal Uncle    • Hypertension Paternal Grandmother    • Colon Cancer Neg    • Breast Cancer Neg    • Prostate Cancer Neg    • Endometrial Cancer Neg      Social History     Socioeconomic History   • Marital status:    Tobacco Use   • Smoking status: Former     Current packs/day: 0.00     Types: Cigarettes     Quit date: 1998     Years since quittin.5   • Smokeless tobacco: Never   Vaping Use   • Vaping status: Never Used   Substance and Sexual Activity   • Alcohol use: Yes     Comment: occ   • Drug use: Not Currently   • Sexual activity: Not Currently     Partners: Male     Birth control/protection: Condom   Other Topics Concern   • Pt has a pacemaker No   • Pt has a defibrillator No   • Reaction to local anesthetic No       Available pre-op labs reviewed.  Lab Results   Component Value Date    URINEPREG Negative 2024             Vital Signs:  Body mass index is 20.8 kg/m².    height is 1.651 m (5' 5\") and weight is 56.7 kg (125 lb). Her blood pressure is 117/57 and her pulse is 53. Her respiration is 13 and oxygen saturation is 98%.   Vitals:    07/20/24 1458 07/24/24 1235   BP:  117/57   Pulse:  53   Resp:  13   SpO2:  98%   Weight: 56.7 kg (125 lb) 56.7 kg (125 lb)   Height: 1.651 m (5' 5\") 1.651 m (5' 5\")        Anesthesia Evaluation     Patient summary reviewed and Nursing notes reviewed    Airway   Mallampati: II  TM distance: >3 FB  Neck ROM: full  Dental - Dentition appears grossly intact     Pulmonary - normal exam   Cardiovascular - negative ROS and normal exam    Neuro/Psych    (+)  anxiety/panic attacks,  depression      GI/Hepatic/Renal - negative ROS   (+) bowel prep    Endo/Other - negative ROS   Abdominal  - normal exam     Other findings: H/H WNL          Anesthesia Plan:   ASA:  2  Plan:   MAC  Plan Comments: Anesthesia complication related to prior epidural, needed more the expected  Informed Consent Plan and Risks Discussed With:  Patient      I have informed Kayla Ardon and/or legal guardian or family member of the nature of the anesthetic plan, benefits, risks including possible dental damage if relevant, major complications, and any alternative forms of anesthetic management.   All of the patient's questions were answered to the best of my ability. The patient desires the anesthetic management as planned.  Roseann Jones CRNA  7/24/2024 12:42 PM  Present on Admission:  **None**

## 2024-07-24 NOTE — ANESTHESIA POSTPROCEDURE EVALUATION
Patient: Kayla Ardon    Procedure Summary       Date: 07/24/24 Room / Location: [facility] ENDOSCOPY 04 / [facility] ENDOSCOPY    Anesthesia Start: 1313 Anesthesia Stop:     Procedure: COLONOSCOPY Diagnosis:       Screen for colon cancer      (diverticulosis; hemorrhoids)    Surgeons: Marnie Lazo MD Anesthesiologist: Roseann Jones CRNA    Anesthesia Type: MAC ASA Status: 2            Anesthesia Type: MAC    Vitals Value Taken Time   /59 07/24/24 1400   Temp 36 07/24/24 1402   Pulse 49 07/24/24 1401   Resp 18 07/24/24 1401   SpO2 99 % 07/24/24 1401   Vitals shown include unfiled device data.    [facility] AN Post Evaluation:   Patient Evaluated in PACU  Patient Participation: complete - patient participated  Level of Consciousness: awake  Pain Score: 0  Pain Management: adequate  Airway Patency:patent  Dental exam unchanged from preop  Yes    Nausea/Vomiting: none  Cardiovascular Status: acceptable  Respiratory Status: acceptable  Postoperative Hydration acceptable  Comments: Report to Dyan Jones CRNA  7/24/2024 2:02 PM

## 2024-09-23 ENCOUNTER — PATIENT MESSAGE (OUTPATIENT)
Dept: INTERNAL MEDICINE CLINIC | Facility: CLINIC | Age: 44
End: 2024-09-23

## 2024-09-24 NOTE — TELEPHONE ENCOUNTER
From: Kayla Ardon  To: Jagruti Jenkins  Sent: 9/23/2024 10:32 AM CDT  Subject: Amoxicillin Reaction    I went to urgent care last week for a possible infected wound on my thum and possible strep throat. They prescribed Amoxicillin and a topical antibiotic for my wound. On day #3 I started itching. On day #7 I developed a rash on my abdomen. Today, day #8, it spread to my arms, neck, chest and face. I just took Zyrtec and plan on discontinuing amoxicillin. I’m in no acute pain/distress but I have 2 days left of the prescription. Do I need to have something else prescribed? Should I avoid all PCNs in the future? What a pain!!    Thank you!

## 2024-09-24 NOTE — TELEPHONE ENCOUNTER
Per Dr Jenkins's note- she has already communicated to pt to stop taking the Amoxicillin Rx due to rash/ possible allergic rxn.  Pt to call if she needs F/U appt-- no improvement in sore throat or finger infection.

## 2024-10-22 ENCOUNTER — OFFICE VISIT (OUTPATIENT)
Dept: FAMILY MEDICINE CLINIC | Facility: CLINIC | Age: 44
End: 2024-10-22
Payer: COMMERCIAL

## 2024-10-22 ENCOUNTER — HOSPITAL ENCOUNTER (OUTPATIENT)
Dept: GENERAL RADIOLOGY | Age: 44
Discharge: HOME | End: 2024-10-22
Attending: NURSE PRACTITIONER
Payer: COMMERCIAL

## 2024-10-22 VITALS
TEMPERATURE: 101 F | HEIGHT: 65 IN | RESPIRATION RATE: 18 BRPM | SYSTOLIC BLOOD PRESSURE: 118 MMHG | HEART RATE: 93 BPM | OXYGEN SATURATION: 98 % | WEIGHT: 131 LBS | DIASTOLIC BLOOD PRESSURE: 58 MMHG | BODY MASS INDEX: 21.83 KG/M2

## 2024-10-22 DIAGNOSIS — J18.9 PNEUMONIA OF LEFT LOWER LOBE DUE TO INFECTIOUS ORGANISM: Primary | ICD-10-CM

## 2024-10-22 DIAGNOSIS — J22 LOWER RESPIRATORY INFECTION: ICD-10-CM

## 2024-10-22 PROCEDURE — 99214 OFFICE O/P EST MOD 30 MIN: CPT | Performed by: NURSE PRACTITIONER

## 2024-10-22 PROCEDURE — 71046 X-RAY EXAM CHEST 2 VIEWS: CPT | Performed by: NURSE PRACTITIONER

## 2024-10-22 PROCEDURE — 87637 SARSCOV2&INF A&B&RSV AMP PRB: CPT | Performed by: NURSE PRACTITIONER

## 2024-10-22 RX ORDER — DOXYCYCLINE HYCLATE 100 MG
100 TABLET ORAL 2 TIMES DAILY
Qty: 14 TABLET | Refills: 0 | Status: SHIPPED | OUTPATIENT
Start: 2024-10-22 | End: 2024-10-29

## 2024-10-22 NOTE — PROGRESS NOTES
CHIEF COMPLAINT:     Chief Complaint   Patient presents with    Cough     5 days w/Cough, body aches, fatigue,fever and congestion- Entered by patient       HPI:   Kayla Ardon is a 43 year old female presents to clinic with complaint of URI/cough symptoms x5 days.  Still has deep cough and fevers, night sweats, very fatigued, congestion, chest hurts to cough, ears hurt to swallow, cough worse when laying on left.  Denies dyspnea, wheezing, SOB, GI complaints, or rashes.  Daughter was just diagnosed with pneumonia, she is on amoxicillin and starting to get a little better.  No known hx of asthma/pneumonia.  Taking nyquil, dayquil.  Denies pregnancy/nursing.    Current Outpatient Medications   Medication Sig Dispense Refill    Doxycycline Hyclate 100 MG Oral Tab Take 1 tablet (100 mg total) by mouth 2 (two) times daily for 7 days. 14 tablet 0    Drospirenone-Ethinyl Estradiol (YESSI) 3-0.02 MG Oral Tab Take 1 tablet by mouth daily. 84 tablet 3    escitalopram 20 MG Oral Tab Take 1 tablet (20 mg total) by mouth daily. 90 tablet 3    Magnesium 400 MG Oral Cap       Multiple Vitamins-Minerals (MULTI-VITAMIN/MINERALS) Oral Tab Take 1 tablet by mouth daily.      Magnesium Cl-Calcium Carbonate 71.5-119 MG Oral Tab EC Take 2 tablets by mouth daily.      Turmeric (QC TUMERIC COMPLEX OR)  (Patient not taking: Reported on 10/22/2024)      Vitamin D3, Cholecalciferol, 1000 UNITS Oral Cap Take 1,000 Units by mouth daily. (Patient not taking: Reported on 10/5/2022)        Past Medical History:    Anesthesia complication    needed more sedation with C-sections, \"weird laughing, crying\" episode with epidural per pt    Anxiety state    Depression    Depression with anxiety    on lexapro      Social History:  Social History     Socioeconomic History    Marital status:    Tobacco Use    Smoking status: Former     Current packs/day: 0.00     Types: Cigarettes     Quit date: 1998     Years since quittin.8     Smokeless tobacco: Never   Vaping Use    Vaping status: Never Used   Substance and Sexual Activity    Alcohol use: Yes     Comment: occ    Drug use: Not Currently    Sexual activity: Not Currently     Partners: Male     Birth control/protection: Condom   Other Topics Concern    Pt has a pacemaker No    Pt has a defibrillator No    Reaction to local anesthetic No        REVIEW OF SYSTEMS:   GENERAL HEALTH: feels well otherwise, normal appetite  SKIN: denies any unusual skin lesions or rashes  HEENT: denies ear pain or difficulty swallowing/eating. See HPI  RESPIRATORY: denies shortness of breath or wheezing  CARDIOVASCULAR: denies chest pain or palpitations   GI: denies vomiting or diarrhea  NEURO: denies dizziness or lightheadedness    EXAM:   /58   Pulse 93   Temp (!) 100.9 °F (38.3 °C)   Resp 18   Ht 5' 5\" (1.651 m)   Wt 131 lb (59.4 kg)   LMP 09/29/2024 (Approximate)   SpO2 98%   BMI 21.80 kg/m²   GENERAL: well developed, well nourished, in no apparent distress  SKIN: no rashes, no suspicious lesions  HEAD: atraumatic, normocephalic  EYES: conjunctiva clear, EOM intact  EARS: TM's clear, non-injected, no bulging, retraction, or fluid bilaterally  NOSE: nostrils patent, +clear exudates, nasal mucosa pink and noninflamed  THROAT: oral mucosa pink, moist. +Posterior pharynx mildly erythematous. No exudates.   NECK: supple, non-tender  LUNGS: +Mildly diminished in bases.  No crackles or rhonchi.  Breathing is non labored. +Deep congested cough.  CARDIO: RRR without murmur  LYMPH: No lymphadenopathy.      PROCEDURE: XR CHEST PA + LAT CHEST (CPT=71046)     COMPARISON: [facility], X CHEST PA LAT ROUTINE, 9/29/2015, 10:14 AM.     INDICATIONS: Cough and congestion.  Lower respiratory infection.     TECHNIQUE:   Two views.       FINDINGS:  CARDIAC/VASC: Normal.  No cardiac silhouette abnormality or cardiomegaly.  Unremarkable pulmonary vasculature.    MEDIAST/ANA: No visible mass or  adenopathy.  LUNGS/PLEURA: There has been development of consolidation in the left lower lobe.  No pleural effusion or pneumothorax.  BONES: No fracture or visible bony lesion.  OTHER: Negative.                 Impression   CONCLUSION:  Left lower lobe consolidation, which most likely relates to pneumonia in the appropriate clinical setting.        A follow-up chest x-ray is recommended in 1 month after completion of therapy to confirm resolution.         ASSESSMENT AND PLAN:   Assessment:   Kayla was seen today for cough.    Diagnoses and all orders for this visit:    Pneumonia of left lower lobe due to infectious organism  -     XR CHEST PA + LAT CHEST (CPT=71046); Future  -     SARS-CoV-2/Flu A and B/RSV by PCR ([facility]); Future  -     SARS-CoV-2/Flu A and B/RSV by PCR ([facility])  -     Doxycycline Hyclate 100 MG Oral Tab; Take 1 tablet (100 mg total) by mouth 2 (two) times daily for 7 days.        Plan:   - Quad respiratory panel sent  - Check CXR.  ---------------------------------------  - CXR reveal LLL pneumonia.  - START doxy as above.  Pt has possible PCN allergy and is generally healthy.  - Comfort measures explained and discussed as listed in Patient Instructions.  - Advised follow up with PCP within 1 month per recommendation from radiology report  - Advised to follow up with PCP or IC if no clinic improvement within 2-3 days.  - Advised to proceed to ER if ever dyspnea, chest pain, SOB, dehydration.  - Pt verbalizes understanding and is agreeable w/ plan.    There are no Patient Instructions on file for this visit.

## 2024-10-23 LAB
FLUAV + FLUBV RNA SPEC NAA+PROBE: NOT DETECTED
FLUAV + FLUBV RNA SPEC NAA+PROBE: NOT DETECTED
RSV RNA SPEC NAA+PROBE: NOT DETECTED
SARS-COV-2 RNA RESP QL NAA+PROBE: NOT DETECTED

## 2024-12-08 DIAGNOSIS — F41.9 ANXIETY: ICD-10-CM

## 2024-12-09 RX ORDER — ESCITALOPRAM OXALATE 20 MG/1
20 TABLET ORAL DAILY
Qty: 90 TABLET | Refills: 3 | Status: SHIPPED | OUTPATIENT
Start: 2024-12-09

## 2025-01-17 ENCOUNTER — TELEPHONE (OUTPATIENT)
Dept: OBGYN CLINIC | Facility: CLINIC | Age: 45
End: 2025-01-17

## 2025-01-17 NOTE — TELEPHONE ENCOUNTER
Received fax from pharmacy to notify that patient's insurance plan does not cover Glenys. Alternative medications include Junel Fe 1/20. Message to Rosemarie Rodriguez.

## 2025-01-17 NOTE — TELEPHONE ENCOUNTER
She has been on this since June 2024 when I saw her and even prior to that. Please initiate prior auth if needed. Patient has been on and using for heavy periods

## 2025-01-17 NOTE — TELEPHONE ENCOUNTER
Left message to call back     If she wants to proceed with generic for YESSI, she can just call and let pharmacy know to fill generic.

## 2025-01-17 NOTE — TELEPHONE ENCOUNTER
Called pharmacy for more info. Brand YESSI has $300 copay, however generic drospirenone-ethinyl estrad 3-0.02 is covered for $0 copay.  To Rosemarie Rodriguez to confirm okay to substitute generic?

## 2025-01-21 ENCOUNTER — PATIENT MESSAGE (OUTPATIENT)
Dept: OBGYN CLINIC | Facility: CLINIC | Age: 45
End: 2025-01-21

## 2025-01-21 DIAGNOSIS — Z30.41 ORAL CONTRACEPTIVE PILL SURVEILLANCE: ICD-10-CM

## 2025-01-21 RX ORDER — DROSPIRENONE AND ETHINYL ESTRADIOL 0.02-3(28)
1 KIT ORAL DAILY
Qty: 84 TABLET | Refills: 3 | OUTPATIENT
Start: 2025-01-21 | End: 2026-01-21

## 2025-01-22 NOTE — TELEPHONE ENCOUNTER
Called pts pharmacy and spoke with Kathie, pharmacist. Verbal given to kathie that pt can have generic ruma. Pt informed via my chart.

## 2025-02-07 NOTE — PROGRESS NOTES
HPI:   Kayla Ardon is a 44 year old female who presents for a complete physical exam.    Last pap:  UTD with Dr Jacinto  Menses:  Regular on ocps - much lighter  Exercise:  5 days a week - weights and cardio    Anxiety- on lexapro for years   helping her a lot.     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     Cholesterol, Total (mg/dL)   Date Value   2024 202 (H)   2021 216 (H)   2018 193   2015 185     HDL Cholesterol (mg/dL)   Date Value   2024 88 (H)   2021 84 (H)   2018 71     HDL CHOLESTEROL (P) (mg/dL)   Date Value   2015 62     LDL Cholesterol (mg/dL)   Date Value   2024 92   2021 115 (H)   2018 105 (H)   2015 108 (H)        Current Outpatient Medications   Medication Sig Dispense Refill    ESCITALOPRAM 20 MG Oral Tab TAKE 1 TABLET(20 MG) BY MOUTH DAILY 90 tablet 3    Drospirenone-Ethinyl Estradiol (YESSI) 3-0.02 MG Oral Tab Take 1 tablet by mouth daily. 84 tablet 3    Turmeric (QC TUMERIC COMPLEX OR)       Magnesium 400 MG Oral Cap       Multiple Vitamins-Minerals (MULTI-VITAMIN/MINERALS) Oral Tab Take 1 tablet by mouth daily.      Magnesium Cl-Calcium Carbonate 71.5-119 MG Oral Tab EC Take 2 tablets by mouth daily.        Past Medical History:    Anesthesia complication    needed more sedation with C-sections, \"weird laughing, crying\" episode with epidural per pt    Anxiety state    Depression    Depression with anxiety    on lexapro      Past Surgical History:   Procedure Laterality Date      X2    2009     Colonoscopy N/A 2024    Procedure: COLONOSCOPY;  Surgeon: Marnie Lazo MD;  Location: [facility] ENDOSCOPY      Family History   Problem Relation Age of Onset    Cancer Maternal Grandfather         bone, bladder and prostate cancer    Melanoma Maternal Grandfather     Ovarian Cancer Maternal Aunt         30's    Hypertension Paternal Grandfather     Diabetes Paternal Grandfather     Heart Disorder Paternal Grandfather     Non Melanoma Mother     Non Melanoma Father     Non Melanoma Maternal Aunt     Non Melanoma Maternal Uncle     Hypertension Paternal Grandmother     Colon Cancer Neg     Breast Cancer Neg     Prostate Cancer Neg     Endometrial Cancer Neg       Social History:   Social History     Socioeconomic History    Marital status:    Tobacco Use    Smoking status: Former     Current packs/day: 0.00     Types: Cigarettes     Quit date: 1998     Years since quittin.1    Smokeless tobacco: Never   Vaping Use    Vaping status: Never Used   Substance and Sexual Activity    Alcohol use: Yes     Comment: occ    Drug use: Not Currently    Sexual activity: Not Currently     Partners: Male     Birth control/protection: Condom   Other Topics Concern    Pt has a pacemaker No    Pt has a defibrillator No    Reaction to local anesthetic No          REVIEW OF SYSTEMS:   GENERAL: feels well otherwise,  LUNGS: denies shortness of breath  CARDIOVASCULAR: denies chest pain  GI: see hPI  PSYCHE:  Anxiety controlled    EXAM:   /64   Pulse 95   Ht 5' 5\" (1.651 m)   Wt 128 lb (58.1 kg)   LMP 2024 (Approximate)   SpO2 100%   BMI 21.30 kg/m²   Body mass index is 21.3 kg/m².   GENERAL: well developed, well nourished,in no apparent distress  SKIN: no rashes,no suspicious lesions  HEENT: atraumatic, normocephalic,  EYES:,conjunctiva are clear  NECK: supple,no adenopathy  BREAST:no masses or skin changes notes   LUNGS: clear to auscultation  CARDIO: RRR without murmur  GI: good BS's,no masses, HSM or  tenderness  EXTREMITIES: no edema    ASSESSMENT AND PLAN:   Kayla Ardon is a 44 year old female who presents for a complete physical exam.  Encounter Diagnoses   Name Primary?    Physical exam Yes    Anxiety     Abnormal mammogram     Hip pain, unspecified laterality        Discussed with patient pap smear guidelines and the importance of screening for cervical cancer  Discussed the importance of yearly mammograms starting at age 40 to help detect breast cancer.   Self breast exam explained and encouraged to perform monthly.   Health maintenance labs, recommend yearly: Lipids, CMP, and CBC.   Discussed importance of screening for colon cancer with colonoscopies starting at age 45, or sooner if high risk  Recommended low fat diet, low carb diet and regular aerobic exercise.    The patient indicates understanding of these issues and agrees to the plan.  The patient is asked to return for CPX in 1 yr.  1. Physical exam  Continue healthy diet and exercise  BMI 21, reassurance   - CBC With Differential With Platelet; Future  - Comp Metabolic Panel (14); Future  - TSH W Reflex To Free T4; Future  - Lipid Panel; Future    2. Anxiety  Controlled, continue lexapro 20 mg     3. Abnormal mammogram  Overdue for diagnostic, patient will schedule   - CHRISTOPHE MARKEL 2D+3D DIAGNOSTIC CHRISTOPHE  BILAT (CPT=77066/70948); Future    4. Hip pain, unspecified laterality      - Ortho Referral - In Network        Orders Placed This Encounter   Procedures    CBC With Differential With Platelet    Comp Metabolic Panel (14)    TSH W Reflex To Free T4    Lipid Panel       Meds & Refills for this Visit:  Requested Prescriptions      No prescriptions requested or ordered in this encounter       Imaging & Consults:  ORTHOPEDIC - INTERNAL  CHRISTOPHE MARKEL 2D+3D DIAGNOSTIC CHRISTOPHE  BILAT (PVV=32020/36010)

## 2025-02-08 ENCOUNTER — OFFICE VISIT (OUTPATIENT)
Dept: INTERNAL MEDICINE CLINIC | Facility: CLINIC | Age: 45
End: 2025-02-08
Payer: COMMERCIAL

## 2025-02-08 VITALS
HEIGHT: 65 IN | BODY MASS INDEX: 21.33 KG/M2 | DIASTOLIC BLOOD PRESSURE: 64 MMHG | SYSTOLIC BLOOD PRESSURE: 116 MMHG | HEART RATE: 95 BPM | WEIGHT: 128 LBS | OXYGEN SATURATION: 100 %

## 2025-02-08 DIAGNOSIS — F41.9 ANXIETY: ICD-10-CM

## 2025-02-08 DIAGNOSIS — R92.8 ABNORMAL MAMMOGRAM: ICD-10-CM

## 2025-02-08 DIAGNOSIS — M25.559 HIP PAIN, UNSPECIFIED LATERALITY: ICD-10-CM

## 2025-02-08 DIAGNOSIS — Z00.00 PHYSICAL EXAM: Primary | ICD-10-CM

## 2025-02-08 PROCEDURE — 99396 PREV VISIT EST AGE 40-64: CPT | Performed by: FAMILY MEDICINE

## 2025-02-09 ENCOUNTER — MED REC SCAN ONLY (OUTPATIENT)
Dept: INTERNAL MEDICINE CLINIC | Facility: CLINIC | Age: 45
End: 2025-02-09

## 2025-04-08 ENCOUNTER — PATIENT MESSAGE (OUTPATIENT)
Dept: INTERNAL MEDICINE CLINIC | Facility: CLINIC | Age: 45
End: 2025-04-08

## 2025-04-09 NOTE — TELEPHONE ENCOUNTER
Left a message on Kayla's voice mail to call the office. Mychart message sent to Kayla.      Triage symptoms.

## 2025-04-10 ENCOUNTER — NURSE TRIAGE (OUTPATIENT)
Dept: INTERNAL MEDICINE CLINIC | Facility: CLINIC | Age: 45
End: 2025-04-10

## 2025-04-10 NOTE — TELEPHONE ENCOUNTER
Spoke with Kayla who states she is having life changes going through a divorce with moving out of her home. Patient feels very anxious and has difficulty initiating sleep and maintaining sleep. Patient denied suicidal or homicidal ideation. Patient was intermittent crying discussing her social circumstances. Patient is taking Lexapro. Patient is currently working with a counselor.     Disposition: Seen in office 3 days. RN offered appointment on 4/17/24 with Dr. Jenkins but she declined. 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 Patient has Friday off, but RN informed her Dr. Jenkins is off on Friday.     RN informed Kayla for any thoughts of hurting herself or inability to complete ADLs to go immediately to the emergency department. Patient voiced understanding.     Kayla is requesting Dr. Jenkins send in a prescription for sleep medication.         Reason for Disposition   MODERATE anxiety (e.g., persistent or frequent anxiety symptoms; interferes with sleep, school, or work)    Protocols used: Anxiety and Panic Attack-A-OH

## 2025-04-11 NOTE — TELEPHONE ENCOUNTER
Attempted to contact patient. Unable to reach. Message left on voicemail to call and et up appointment  for 4/19/25

## 2025-04-11 NOTE — TELEPHONE ENCOUNTER
I would like to talk to her before prescribing sleep medicine  Can she do Saturday 4/19, I have a lot of openings  Or I am willing to use any lunch spot for a virtual visit for her  Thanks

## 2025-04-14 NOTE — TELEPHONE ENCOUNTER
Can you please follow-up with her?  I can see her 1 day during lunch virtual or next Saturday I will add her in.  Thank you

## 2025-04-16 ENCOUNTER — TELEPHONE (OUTPATIENT)
Dept: INTERNAL MEDICINE CLINIC | Facility: CLINIC | Age: 45
End: 2025-04-16

## 2025-04-16 NOTE — TELEPHONE ENCOUNTER
[facility] called.  They are asking for a call back to clarify directions for the Hydroxyzine 25 MG Oral Tab medication.    [id number] - [place], [place] - [address] AT [facility], [phone], [phone] [address] [place] [address]   Phone: [phone] Fax: [phone]   Hours: Not open 24 hours

## 2025-05-09 NOTE — TELEPHONE ENCOUNTER
Appt made on 5/20/2021. Pt advised to get a blood pregnancy test done the day before. Order placed. Message to CAP. Pt's colpo was due in 4/2020. Ok to wait until 5/2021 to have done? No earlier appts available.
LMTCB
LMTCB    (open appt noted on CAP schedule for May 19th res24 w/procedure room open)
Message to CAP. Pt's last pap was on 1/21/2020. Pt was to have a colpo in 4/2020. This was never done. Should pt reschedule the colpo or come in for an annual and pap since it has been over a year since her last pap?
Patient calling back.  Has questions
Pt is calling the nurse
She needs to have the colpo- please help her with scheduling it
improved tolerance, though still very difficult for pt to complete. VC's for improved TA activation w/ good follow through.  Treatment Diagnosis: LBP, decreased lumbar and B hip AROM, decreased B LE and core strength, impaired balance, impaired gait, and decreased activity tolerance  Therapy Prognosis: Good       Patient Education: [] NA   Anatomy of condition    Post-Pain Assessment:       Pain Rating (0-10 pain scale):   0/10   Location and pain description same as pre-treatment unless indicated.   Action: [] NA   [x] Perform HEP  [] Meds as prescribed  [] Modalities as prescribed   [] Call Physician     GOALS   Patient Goal(s): Patient Goals : Increase leg and core strength, no falls    Long Term Goals Completed by 6 weeks Goal Status   LTG 1 The pt will be indep/compliant with HEP to self manage indep upon D/C In progress   LTG 2 The pt will have a decrease in EVELYN score >/=10 points in order to increase functional activity tolerance In progress   LTG 3 The pt will demo improved lumbar ext, B SB, and Rotation AROM >/=25%  in order to increase ease of ADL's In progress   LTG 4 The pt will demo improved B hip IR/ER/Flex AROM >/=10* ea in order to improve lumbopelvic mechanics and decrease pain with ADL's In progress   LTG 5 The pt will demo improved B LE strength >/= 4+ to 5/5 and core strength >/=fair in order to improve upright posture with standing and walking In progress   LTG 6 The pt will demo 5x sit to stand and TUG </=13 secondsand B SLS >/=5\" in order to increase safety with functional mobility In progress   LTG 7 The pt will demo improved DGI >/=18 to improve dynamic and functional balance and decrease risk for falls In progress          Plan:  Frequency/Duration:  Plan  Plan Frequency: 2  Plan weeks: 6  Current Treatment Recommendations: Strengthening, ROM, Balance training, Functional mobility training, Gait training, Stair training, Transfer training, Neuromuscular re-education, Manual, Pain

## 2025-06-12 DIAGNOSIS — Z30.41 ORAL CONTRACEPTIVE PILL SURVEILLANCE: ICD-10-CM

## 2025-06-12 RX ORDER — DROSPIRENONE AND ETHINYL ESTRADIOL 0.02-3(28)
1 KIT ORAL DAILY
Qty: 84 TABLET | Refills: 3 | OUTPATIENT
Start: 2025-06-12 | End: 2026-06-12

## 2025-06-12 NOTE — TELEPHONE ENCOUNTER
Requested Prescriptions     Pending Prescriptions Disp Refills    DROSPIRENONE-ETHINYL ESTRADIOL 3-0.02 MG Oral Tab [Pharmacy Med Name: DROSPIRENONE/ETHY EST 3/0.02MG T 28] 84 tablet 3     Sig: TAKE 1 TABLET BY MOUTH DAILY     Last annual 6/12/24  Last filled 6/12/24 x 1 year  Pap UTD  -Mammo overdue. Last done 3/7/24- told to return in 6 mo for bilateral diagnostic mammogram and right breast ultrasound.   PCP ordered bilateral diagnostic mammogram now, since she is due for annual mammogram.   -Next annual due June 2025.  Mychart sent.

## 2025-07-08 ENCOUNTER — OFFICE VISIT (OUTPATIENT)
Dept: OBGYN CLINIC | Facility: CLINIC | Age: 45
End: 2025-07-08
Payer: COMMERCIAL

## 2025-07-08 VITALS
BODY MASS INDEX: 19.99 KG/M2 | HEART RATE: 64 BPM | HEIGHT: 65 IN | SYSTOLIC BLOOD PRESSURE: 131 MMHG | DIASTOLIC BLOOD PRESSURE: 75 MMHG | WEIGHT: 120 LBS

## 2025-07-08 DIAGNOSIS — Z01.419 WELL WOMAN EXAM WITH ROUTINE GYNECOLOGICAL EXAM: Primary | ICD-10-CM

## 2025-07-08 DIAGNOSIS — Z11.3 SCREENING EXAMINATION FOR STI: ICD-10-CM

## 2025-07-08 DIAGNOSIS — Z30.41 ORAL CONTRACEPTIVE PILL SURVEILLANCE: ICD-10-CM

## 2025-07-08 PROCEDURE — 99396 PREV VISIT EST AGE 40-64: CPT | Performed by: NURSE PRACTITIONER

## 2025-07-08 RX ORDER — DROSPIRENONE AND ETHINYL ESTRADIOL 0.02-3(28)
1 KIT ORAL DAILY
Qty: 84 TABLET | Refills: 4 | Status: SHIPPED | OUTPATIENT
Start: 2025-07-08

## 2025-07-08 NOTE — PROGRESS NOTES
[facility]    Obstetrics and Gynecology    Chief Complaint   Patient presents with    Gyn Exam     Annual, ocp refill        Kayla Ardon is a 44 year old female  Patient's last menstrual period was 2025 (approximate). presenting for annual gynecology exam.  History of Present Illness  Breast cyst  - Right breast cyst is tender and becomes more painful during menstrual period  - No change in size of the cyst  - Last breast imaging performed in 2024  - Missed recommended six-month follow-up imaging    Sexually transmitted infection screening  - Requests STD testing, new partner, using condoms.  - Opted for cultures for gonorrhea, chlamydia, and trichomonas  - Declined blood work for HIV, hepatitis, and syphilis    Menstrual irregularity and contraceptive use  - Continues [employer] birth control  - Previously had heavy menstrual bleeding, now periods are sporadic, very light, and last only a few days  - No headaches, vision changes, chest pain, shortness of breath, leg pain, or swelling associated with birth control use    Anxiety   - on escitalopram  - seeing counselor.  - increased stress with her daughter who is having some anxiety/depression struggles.     Pap: 2023 NILM, negative human papillomavirus; due in 3 years   2021 colpo DEIRDRE 1              - ASCUS, human papillomavirus +     Contraception:ocps  Mammo: see below - overdue for 6 month follow up - has this scheduled      Study Result    Narrative   PROCEDURE: [facility] MARKEL 2D+3D DIAGNOSTIC CHRISTOPHE BILAT (CPT=77066/14287)     COMPARISON: [facility],  BREAST BILATERAL LTD (CPT=76642-50), 3/07/2024, 7:54 AM.  [facility] MARKEL 2D+3D SCREENING BILAT ([id number]), 2021, 12:51 PM.  [facility], [facility] MARKEL  2D+3D SCREENING BILAT ([id number]), 2020, 11:23 AM.  [facility] MARKEL 2D+3D SCREENING BILAT ([id number]), 2022, 1:26 PM.      INDICATIONS: Breast cyst, right.  43-year-old female for evaluation of a right breast palpable abnormality     TECHNIQUE: Digital diagnostic mammography was performed and images were reviewed with the [employer] VALENTINA 1.5.1.5 CAD device.  3D tomosynthesis was performed and reviewed.        BREAST COMPOSITION:   Category c-Heterogeneously dense, which may obscure small masses.        FINDINGS: Bilateral CC, MLO, right mL, and right spot compression views were obtained.  A marker was placed demarcating patient's right breast palpable abnormality.  A focal asymmetry is seen in the posterior medial right breast corresponding to this  marker.  However, mostly effaces on spot compression views.  An asymmetry is seen in the central right breast which is less conspicuous on spot compression views.  A possible focal asymmetry seen in the left breast upper outer quadrant.  Allowing for  differences in technique, this is probably stable when compared to the April 2021 exam. The parenchyma pattern is otherwise stable with no other new suspicious asymmetry, mass, architectural distortion, or microcalcifications identified in either breast.            Targeted ultrasound the right breast with special attention to the 05:00 o'clock region 5 centimeters from the nipple corresponding to the area of palpable abnormality as indicated by the patient was performed.  There is heterogeneous fibroglandular  tissue.  No mass or sonographic abnormality is seen in the region of right breast palpable abnormality.        Ultrasound the remainder of the right breast was performed.  There is heterogeneous fibroglandular tissue.    A 0.5 x 0.4 x 0.5 centimeter ovoid hypoechoic mass is seen at 10:00 o'clock 1 centimeter from the nipple. No internal vascularity is seen on color Doppler imaging.    A 0.8 x 0.2 x 0.6 centimeter cyst is seen in the subareolar region. No internal vascularity is seen on color Doppler imaging.     Targeted ultrasound the lateral  left breast was performed.  There is heterogeneous fibroglandular tissue.  No mass is seen.                  Impression   CONCLUSION:    1. No mammographic or sonographic abnormality seen to account for the right breast palpable abnormality. Clinical management is recommended.  Further management of the palpable abnormality, which may include surgical consultation and/or biopsy, should be   based on physical exam findings and clinical suspicion.     2. Mostly effacing right breast asymmetry without corresponding sonographic abnormality is probably benign.  Six-month follow-up right breast diagnostic mammogram is recommended.     3. Incidental right breast sonographic mass at 10:00 o'clock 1 centimeter from the nipple has a probably benign sonographic appearance.  Six-month follow-up right breast ultrasound is recommended.       4. Left breast focal asymmetry without corresponding sonographic abnormality is probably benign.  Allowing for differences in technique, this is probably stable when compared to the 2021 study.  Six-month follow-up left breast diagnostic mammogram  is recommended.        BI-RADS CATEGORY:    DIAGNOSTIC CATEGORY 3--PROBABLY BENIGN FINDING.       RECOMMENDATIONS:  CLINICAL EVALUATION AND MANAGEMENT FOR RIGHT BREAST PALPABLE ABNORMALITY.       SHORT TERM FOLLOW-UP DIAGNOSTIC MAMMOGRAM BILATERAL BREASTS IN 6 MONTHS.       SHORT TERM FOLLOW-UP ULTRASOUND RIGHT BREAST IN 6 MONTHS.       OBSTETRICS HISTORY:  OB History    Para Term  AB Living   3 2 2 0 1 2   SAB IAB Ectopic Multiple Live Births   1 0 0 0 2       GYNE HISTORY:  Menarche: 10 (2025  3:01 PM)  Period Cycle (Days): irregular with ocp (2025  3:01 PM)  Period Duration (Days): 5 days (2025  3:01 PM)  Period Flow: light (2025  3:01 PM)  Use of Birth Control (if yes, specify type): OCP (2025  3:01 PM)  Date When Birth Control Last Used: ocp 25 (2025  3:01 PM)  Hx Prior Abnormal Pap: Yes  (2025  3:01 PM)  Pap Date: 23 (2025  3:01 PM)  Pap Result Notes: NEG PAP / NEG HPV (2025  3:01 PM)  Follow Up Recommendation: MAMMO 3-7-24 PENG BENIGN (2025  3:01 PM)      History   Sexual Activity    Sexual activity: Not Currently    Partners: Male    Birth control/ protection: OCP           Latest Ref Rng & Units 2023     1:59 PM 2020    11:11 AM   RECENT PAP RESULTS   INTERPRETATION/RESULT: Negative for intraepithelial lesion or malignancy Negative for intraepithelial lesion or malignancy  Atypical squamous cells of undetermined significance (ASC-US)    HPV Negative Negative  Positive          MEDICAL HISTORY:  Past Medical History:    Anesthesia complication    needed more sedation with C-sections, \"weird laughing, crying\" episode with epidural per pt    Anxiety state    Depression    Depression with anxiety    on lexapro     Past Surgical History:   Procedure Laterality Date      X2    2009    Colonoscopy N/A 2024    Procedure: COLONOSCOPY;  Surgeon: Marnie Lazo MD;  Location: [facility] ENDOSCOPY       SOCIAL HISTORY:  Social History     Socioeconomic History    Marital status:      Spouse name: Not on file    Number of children: Not on file    Years of education: Not on file    Highest education level: Not on file   Occupational History    Not on file   Tobacco Use    Smoking status: Former     Current packs/day: 0.00     Types: Cigarettes     Quit date: 1998     Years since quittin.5    Smokeless tobacco: Never   Vaping Use    Vaping status: Never Used   Substance and Sexual Activity    Alcohol use: Yes     Comment: occ    Drug use: Not Currently    Sexual activity: Not Currently     Partners: Male     Birth control/protection: OCP   Other Topics Concern    Grew up on a farm Not Asked    History of tanning Not Asked    Outdoor occupation Not Asked    Pt has a pacemaker No    Pt has a defibrillator No    Breast feeding Not Asked    Reaction to local  anesthetic No    Caffeine Concern Not Asked    Exercise Not Asked    Seat Belt Not Asked    Special Diet Not Asked    Stress Concern Not Asked    Weight Concern Not Asked   Social History Narrative    Not on file     Social Drivers of Health     Food Insecurity: Not on file   Transportation Needs: Not on file   Stress: Not on file   Housing Stability: Not on file         Depression Screening (PHQ-2/PHQ-9): Over the LAST 2 WEEKS   Little interest or pleasure in doing things (over the last two weeks)?: Not at all    Feeling down, depressed, or hopeless (over the last two weeks)?: Not at all    PHQ-2 SCORE: 0           FAMILY HISTORY:  Family History   Problem Relation Age of Onset    Cancer Maternal Grandfather         bone, bladder and prostate cancer    Melanoma Maternal Grandfather     Ovarian Cancer Maternal Aunt         30's    Hypertension Paternal Grandfather     Diabetes Paternal Grandfather     Heart Disorder Paternal Grandfather     Non Melanoma Mother     Non Melanoma Father     Non Melanoma Maternal Aunt     Non Melanoma Maternal Uncle     Hypertension Paternal Grandmother     Colon Cancer Neg     Breast Cancer Neg     Prostate Cancer Neg     Endometrial Cancer Neg        MEDICATIONS:    Current Outpatient Medications:     Drospirenone-Ethinyl Estradiol (YESSI) 3-0.02 MG Oral Tab, Take 1 tablet by mouth daily., Disp: 84 tablet, Rfl: 4    ESCITALOPRAM 20 MG Oral Tab, TAKE 1 TABLET(20 MG) BY MOUTH DAILY, Disp: 90 tablet, Rfl: 3    Magnesium 400 MG Oral Cap, , Disp: , Rfl:     Multiple Vitamins-Minerals (MULTI-VITAMIN/MINERALS) Oral Tab, Take 1 tablet by mouth daily., Disp: , Rfl:     hydrOXYzine 25 MG Oral Tab, Take 1 tablet (25 mg total) by mouth every 6 (six) hours as needed (panic attack or sleep . can take 1/2 tablet if during the day). (Patient not taking: Reported on 7/8/2025), Disp: 60 tablet, Rfl: 0    ALLERGIES:    Allergies   Allergen Reactions    Amoxicillin RASH     Per Dr Jenkins, possible  Amoxicillin allergy after pruritus noted day 3 of antibx tx, then full body rash by Day 8 when rash reported to PCP.         Review of Systems:  Constitutional:  Denies fatigue, night sweats, hot flashes  Eyes:  denies blurred or double vision  Cardiovascular:  denies chest pain or palpitations  Respiratory:  denies shortness of breath  Gastrointestinal:  denies heartburn, abdominal pain, diarrhea or constipation  Genitourinary:  denies dysuria, incontinence, abnormal vaginal discharge, vaginal itching,   Musculoskeletal:  denies back pain   Skin/Breast:  Denies any breast pain, lumps, or discharge.   Neurological:  denies headaches, extremity weakness or numbness.  Psychiatric: denies depression or anxiety.  Endocrine:   denies excessive thirst or urination.  Heme/Lymph:  denies history of anemia, easy bruising or bleeding.      PHYSICAL EXAM:     Vitals:    07/08/25 1457   BP: 131/75   Pulse: 64   Weight: 120 lb (54.4 kg)   Height: 5' 5\" (1.651 m)       Body mass index is 19.97 kg/m².     Patient offered chaperone, patient declined    Constitutional: well developed, well nourished  Psychiatric:  Oriented to time, place, person and situation. Appropriate mood and affect  Head/Face: normocephalic  Neck/Thyroid: thyroid symmetric, no thyromegaly, no nodules, no adenopathy  Lymphatic:no abnormal supraclavicular or axillary adenopathy is noted  Breast: bilateral fibrocystic dense breasts, normal without palpable masses, tenderness, asymmetry, nipple discharge, nipple retraction or skin changes  Abdomen:  soft, nontender, nondistended, no masses  Skin/Hair: no unusual rashes or bruises  Extremities: no edema, no cyanosis    Pelvic Exam:  External Genitalia: normal appearance, hair distribution, and no lesions  Urethral Meatus:  normal in size, location, without lesions and prolapse  Bladder:  No fullness, masses or tenderness  Vagina:  Normal appearance without lesions, no abnormal discharge  Cervix:  Normal without  tenderness on motion  Uterus: normal in size, contour, position, mobility, without tenderness  Adnexa: normal without masses or tenderness  Perineum: normal  Anus: no hemorroids     Assessment & Plan:    ICD-10-CM    1. Well woman exam with routine gynecological exam  Z01.419       2. Screening examination for STI  Z11.3 Chlamydia/Gc Amplification     Trichomonas vaginalis, VIPIN (Vaginal/Cervical)      3. Oral contraceptive pill surveillance  Z30.41 Drospirenone-Ethinyl Estradiol (YESSI) 3-0.02 MG Oral Tab         Assessment & Plan  Breast   - has follow up imaging scheduled    Contraceptive management  On generic Yessi for menorrhagia with improved symptoms, no adverse effects reported.  - Continue current birth control regimen.    Sexually transmitted infection screening  New sexual partner, agreed to STI cultures, declined blood work.  - Perform STI cultures for gonorrhea, chlamydia, and trichomonas.    General Health Maintenance  Pap smear due in 2026, mammogram scheduled, routine labs pending.  - Ensure mammogram is completed as scheduled.  - Coordinate with primary care provider for routine lab work.    Discussed diet, exercise, MVIs with Ca/Vit D  Follow up in 1 yr for SHIRA Wilson      This note was prepared using Dragon Medical voice recognition dictation software. As a result errors may occur. When identified these errors have been corrected. While every attempt is made to correct errors during dictation discrepancies may still exist.

## 2025-07-08 NOTE — PROGRESS NOTES
The following individual(s) verbally consented to be recorded using ambient AI listening technology and understand that they can each withdraw their consent to this listening technology at any point by asking the clinician to turn off or pause the recording:    Patient name: Kayla Ardon  Additional names:

## 2025-07-09 LAB
C TRACH DNA SPEC QL NAA+PROBE: NEGATIVE
N GONORRHOEA DNA SPEC QL NAA+PROBE: NEGATIVE
T VAGINALIS RRNA SPEC QL NAA+PROBE: NEGATIVE

## 2025-07-29 DIAGNOSIS — Z30.41 ORAL CONTRACEPTIVE PILL SURVEILLANCE: ICD-10-CM

## 2025-07-30 ENCOUNTER — HOSPITAL ENCOUNTER (OUTPATIENT)
Dept: ULTRASOUND IMAGING | Facility: HOSPITAL | Age: 45
Discharge: HOME | End: 2025-07-30
Attending: FAMILY MEDICINE

## 2025-07-30 ENCOUNTER — HOSPITAL ENCOUNTER (OUTPATIENT)
Dept: MAMMOGRAPHY | Facility: HOSPITAL | Age: 45
Discharge: HOME | End: 2025-07-30
Attending: FAMILY MEDICINE

## 2025-07-30 DIAGNOSIS — R92.8 ABNORMAL MAMMOGRAM: ICD-10-CM

## 2025-07-30 DIAGNOSIS — Z30.41 ORAL CONTRACEPTIVE PILL SURVEILLANCE: ICD-10-CM

## 2025-07-30 PROCEDURE — 77066 DX MAMMO INCL CAD BI: CPT | Performed by: FAMILY MEDICINE

## 2025-07-30 PROCEDURE — 77062 BREAST TOMOSYNTHESIS BI: CPT | Performed by: FAMILY MEDICINE

## 2025-07-30 PROCEDURE — 76642 ULTRASOUND BREAST LIMITED: CPT | Performed by: FAMILY MEDICINE

## 2025-07-30 RX ORDER — DROSPIRENONE AND ETHINYL ESTRADIOL 0.02-3(28)
1 KIT ORAL DAILY
Qty: 84 TABLET | Refills: 4 | OUTPATIENT
Start: 2025-07-30

## 2025-07-30 RX ORDER — DROSPIRENONE AND ETHINYL ESTRADIOL 0.02-3(28)
1 KIT ORAL DAILY
Qty: 28 TABLET | Refills: 0 | OUTPATIENT
Start: 2025-07-30

## 2025-08-07 ENCOUNTER — TELEPHONE (OUTPATIENT)
Age: 45
End: 2025-08-07

## (undated) DIAGNOSIS — Z20.822 SUSPECTED COVID-19 VIRUS INFECTION: Primary | ICD-10-CM

## (undated) DIAGNOSIS — F41.9 ANXIETY: ICD-10-CM

## (undated) DEVICE — KIT CLEAN ENDOKIT 1.1OZ GOWNX2

## (undated) DEVICE — CO2 CANNULA,SSOFT,ADLT,7O2,4CO2,FEMALE: Brand: MEDLINE

## (undated) DEVICE — KIT ENDO ORCAPOD 160/180/190

## (undated) DEVICE — MEDI-VAC NON-CONDUCTIVE SUCTION TUBING 6MM X 1.8M (6FT.) L: Brand: CARDINAL HEALTH

## (undated) DEVICE — SYRINGE, LUER SLIP, STERILE, 60ML: Brand: MEDLINE

## (undated) NOTE — LETTER
AUTHORIZATION FOR SURGICAL OPERATION OR OTHER PROCEDURE    1. I hereby authorize Dr. Maggy Samaniego, and [facility] staff assigned to my case to perform the following operation and/or procedure at the [facility]:    Colposcopy        2.   My physician to Patient:           []  Parent    Responsible person                          []  Spouse  In case of minor or                    [] Other  _____________   Incompetent name:  __________________________________________________

## (undated) NOTE — LETTER
[place] [address], [place], [place]    Authorization for Surgical Operation and Procedure                               I hereby authorize Marnie Lazo MD, my physician and his/her assistants (if applicable), which may include medical students, residents, and/or fellows, to perform the following surgical operation/ procedure and administer such anesthesia as may be determined necessary by my physician: Operation/Procedure name (s) COLONOSCOPY on Kayla Ardon   2.   I recognize that during the surgical operation/procedure, unforeseen conditions may necessitate additional or different procedures than those listed above.  I, therefore, further authorize and request that the above-named surgeon, assistants, or designees perform such procedures as are, in their judgment, necessary and desirable.    3.   My surgeon/physician has discussed prior to my surgery the potential benefits, risks and side effects of this procedure; the likelihood of achieving goals; and potential problems that might occur during recuperation.  They also discussed reasonable alternatives to the procedure, including risks, benefits, and side effects related to the alternatives and risks related to not receiving this procedure.  I have had all my questions answered and I acknowledge that no guarantee has been made as to the result that may be obtained.    4.   Should the need arise during my operation/procedure, which includes change of level of care prior to discharge, I also consent to the administration of blood and/or blood products.  Further, I understand that despite careful testing and screening of blood or blood products by collecting agencies, I may still be subject to ill effects as a result of receiving a blood transfusion and/or blood products.  The following are some, but not all, of the potential risks that can occur: fever and allergic reactions, hemolytic reactions, transmission of diseases such  as Hepatitis, AIDS and Cytomegalovirus (CMV) and fluid overload.  In the event that I wish to have an autologous transfusion of my own blood, or a directed donor transfusion, I will discuss this with my physician.  Check only if Refusing Blood or Blood Products  I understand refusal of blood or blood products as deemed necessary by my physician may have serious consequences to my condition to include possible death. I hereby assume responsibility for my refusal and release the hospital, its personnel, and my physicians from any responsibility for the consequences of my refusal.    o  Refuse   5.   I authorize the use of any specimen, organs, tissues, body parts or foreign objects that may be removed from my body during the operation/procedure for diagnosis, research or teaching purposes and their subsequent disposal by hospital authorities.  I also authorize the release of specimen test results and/or written reports to my treating physician on the hospital medical staff or other referring or consulting physicians involved in my care, at the discretion of the Pathologist or my treating physician.    6.   I consent to the photographing or videotaping of the operations or procedures to be performed, including appropriate portions of my body for medical, scientific, or educational purposes, provided my identity is not revealed by the pictures or by descriptive texts accompanying them.  If the procedure has been photographed/videotaped, the surgeon will obtain the original picture, image, videotape or CD.  The hospital will not be responsible for storage, release or maintenance of the picture, image, tape or CD.    7.   I consent to the presence of a  or observers in the operating room as deemed necessary by my physician or their designees.    8.   I recognize that in the event my procedure results in extended X-Ray/fluoroscopy time, I may develop a skin reaction.    9. If I have a Do Not Attempt  Resuscitation (DNAR) order in place, that status will be suspended while in the operating room, procedural suite, and during the recovery period unless otherwise explicitly stated by me (or a person authorized to consent on my behalf). The surgeon or my attending physician will determine when the applicable recovery period ends for purposes of reinstating the DNAR order.  10. Patients having a sterilization procedure: I understand that if the procedure is successful the results will be permanent and it will therefore be impossible for me to inseminate, conceive, or bear children.  I also understand that the procedure is intended to result in sterility, although the result has not been guaranteed.   11. I acknowledge that my physician has explained sedation/analgesia administration to me including the risk and benefits I consent to the administration of sedation/analgesia as may be necessary or desirable in the judgment of my physician.    I CERTIFY THAT I HAVE READ AND FULLY UNDERSTAND THE ABOVE CONSENT TO OPERATION and/or OTHER PROCEDURE.     ____________________________________  _________________________________        ______________________________  Signature of Patient    Signature of Responsible Person                Printed Name of Responsible Person                                      ____________________________________  _____________________________                ________________________________  Signature of Witness        Date  Time         Relationship to Patient    STATEMENT OF PHYSICIAN My signature below affirms that prior to the time of the procedure; I have explained to the patient and/or his/her legal representative, the risks and benefits involved in the proposed treatment and any reasonable alternative to the proposed treatment. I have also explained the risks and benefits involved in refusal of the proposed treatment and alternatives to the proposed treatment and have answered the patient's  questions. If I have a significant financial interest in a co-management agreement or a significant financial interest in any product or implant, or other significant relationship used in this procedure/surgery, I have disclosed this and had a discussion with my patient.     _____________________________________________________              _____________________________  (Signature of Physician)                                                                                         (Date)                                   (Time)  Patient Name: Kayla Ardon      : 11/3/1980      Printed: 2024     Medical Record #: [id number]                                      Page 1 of 1

## (undated) NOTE — LETTER
2/17/2020              Yue Ardon        [address]         Dear Alexia Calderon,      It was a pleasure to see you at our [address] [place] [address] office. Mammogram is normal, repe

## (undated) NOTE — MR AVS SNAPSHOT
After Visit Summary   1/21/2020    Rakel Massey    MRN: [id number]           Visit Information     Date & Time  1/21/2020  9:40 AM Provider  Jm Smith MD [address] [place] [address], [address] [place] [address],[address], [facility]  Phone  [facility]     Around January 21, 2020   Imaging:   [facility] MARKEL 2D+3D SCREENING BILAT (REJ=[id number])    Instructions:   To schedule a test at any [facility], call Central Scheduling at (3 patients. Video Visits are available Monday - Friday for many common conditions such as allergies, colds, cough, fever, rash, sore throat, headache and pink eye.   The cost for a Video Visit is currently $35.         If you receive a survey from [employer] Saturday – Sunday  10:00 am – 4:00 pm  W[facility]IN [facility]  Emergency Medicine Providers  Conditions needing urgent attention, but are   non-life-threatening.     Also available by appointment     Average cost  $120*     EMERGENCY ROOM         Life-threatening brendan

## (undated) NOTE — LETTER
AUTHORIZATION FOR SURGICAL OPERATION OR OTHER PROCEDURE    1.  I hereby authorize Dr. Jayna Rasmussen, and [place] [facility] staff assigned to my case to perform the following operation and/or procedure at the [facility]:    ENDOMETRIAL BIOPSY ________________ Time:  ________ A. M.  P.M.        Patient Name:  ______________________________________________________  (please print)      Patient signature:  ___________________________________________________             Relationship to Patient:

## (undated) NOTE — LETTER
2/9/2023              [facility]rez [address] Duglas        [facility]         Dear Gina Jerez,    This letter is to inform you that our office has made several attempts to reach you by phone and mychart messages without success. We were attempting to contact you by phone regarding an overdue appointment. Please contact our office at the number listed below as soon as you receive this letter to discuss this issue and to make the necessary changes in our system to your contact information. Thank you for your cooperation.         Sincerely,    Dr. Vanna Jacinto  [facility], [address]  [phone]

## (undated) NOTE — LETTER
[facility]  Administration of Anesthesia  I, Kayla Ardon agree to be cared for by a physician anesthesiologist alone and/or with a nurse anesthetist, who is specially trained to monitor me and give me medicine to put me to sleep or keep me comfortable during my procedure    I understand that my anesthesiologist and/or anesthetist is not an employee or agent of [facility] or [facility]. He or she works for [facility]    As the patient asking for anesthesia services, I agree to:  Allow the anesthesiologist (anesthesia doctor) to give me medicine and do additional procedures as necessary. Some examples are: Starting or using an “IV” to give me medicine, fluids or blood during my procedure, and having a breathing tube placed to help me breathe when I’m asleep (intubation). In the event that my heart stops working properly, I understand that my anesthesiologist will make every effort to sustain my life, unless otherwise directed by [facility] Do Not Resuscitate documents.  Tell my anesthesia doctor before my procedure:  If I am pregnant.  The last time that I ate or drank.  iii. All of the medicines I take (including prescriptions, herbal supplements, and pills I can buy without a prescription (including street drugs/illegal medications). Failure to inform my anesthesiologist about these medicines may increase my risk of anesthetic complications.  iv.If I am allergic to anything or have had a reaction to anesthesia before.  I understand how the anesthesia medicine will help me (benefits).  I understand that with any type of anesthesia medicine there are risks:  The most common risks are: nausea, vomiting, sore throat, muscle soreness, damage to my eyes, mouth, or teeth (from breathing tube placement).  Rare risks include: remembering what happened during my procedure, allergic reactions to medications, injury to my airway, heart, lungs, vision, nerves,  or muscles and in extremely rare instances death.  My doctor has explained to me other choices available to me for my care (alternatives).  Pregnant Patients (“epidural”):  I understand that the risks of having an epidural (medicine given into my back to help control pain during labor), include itching, low blood pressure, difficulty urinating, headache or slowing of the baby’s heart. Very rare risks include infection, bleeding, seizure, irregular heart rhythms and nerve injury.  Regional Anesthesia (“spinal”, “epidural”, & “nerve blocks”):  I understand that rare but potential complications include headache, bleeding, infection, seizure, irregular heart rhythms, and nerve injury.    _____________________________________________________________________________  Patient (or Representative) Signature/Relationship to Patient  Date   Time    _____________________________________________________________________________   Name (if used)    Language/Organization   Time    _____________________________________________________________________________  Nurse Anesthetist Signature     Date   Time  _____________________________________________________________________________  Anesthesiologist Signature     Date   Time  I have discussed the procedure and information above with the patient (or patient’s representative) and answered their questions. The patient or their representative has agreed to have anesthesia services.    _____________________________________________________________________________  Witness        Date   Time  I have verified that the signature is that of the patient or patient’s representative, and that it was signed before the procedure  Patient Name: Kayla Ardon     : 11/3/1980                 Printed: 2024 at 7:43 AM    Medical Record #: [id number]                                            Page 1 of 1  ----------ANESTHESIA CONSENT----------